# Patient Record
Sex: FEMALE | Race: OTHER | Employment: FULL TIME | URBAN - METROPOLITAN AREA
[De-identification: names, ages, dates, MRNs, and addresses within clinical notes are randomized per-mention and may not be internally consistent; named-entity substitution may affect disease eponyms.]

---

## 2017-06-05 ENCOUNTER — HOSPITAL ENCOUNTER (OUTPATIENT)
Dept: GENERAL RADIOLOGY | Age: 27
Discharge: HOME | End: 2017-06-05
Payer: COMMERCIAL

## 2017-06-05 DIAGNOSIS — A15.0 TB (PULMONARY TUBERCULOSIS): ICD-10-CM

## 2017-06-05 PROCEDURE — 71020 XR CHEST STANDARD TWO VW: CPT

## 2017-07-27 DIAGNOSIS — J45.20 MILD INTERMITTENT ASTHMA WITHOUT COMPLICATION: ICD-10-CM

## 2017-07-27 DIAGNOSIS — M54.2 NECK PAIN: ICD-10-CM

## 2017-07-27 DIAGNOSIS — R20.2 PARESTHESIA OF BOTH HANDS: ICD-10-CM

## 2017-07-27 RX ORDER — GABAPENTIN 300 MG/1
300 CAPSULE ORAL NIGHTLY
Qty: 30 CAPSULE | Refills: 5 | Status: SHIPPED | OUTPATIENT
Start: 2017-07-27 | End: 2017-09-26

## 2017-07-27 RX ORDER — ALBUTEROL SULFATE 90 UG/1
2 AEROSOL, METERED RESPIRATORY (INHALATION) EVERY 6 HOURS PRN
Qty: 1 INHALER | Refills: 5 | Status: SHIPPED | OUTPATIENT
Start: 2017-07-27 | End: 2017-09-26

## 2017-07-27 RX ORDER — MONTELUKAST SODIUM 10 MG/1
10 TABLET ORAL DAILY
Qty: 30 TABLET | Refills: 5 | Status: SHIPPED | OUTPATIENT
Start: 2017-07-27 | End: 2017-09-26

## 2017-09-26 ENCOUNTER — OFFICE VISIT (OUTPATIENT)
Dept: FAMILY MEDICINE CLINIC | Age: 27
End: 2017-09-26

## 2017-09-26 VITALS
BODY MASS INDEX: 31.49 KG/M2 | SYSTOLIC BLOOD PRESSURE: 110 MMHG | OXYGEN SATURATION: 99 % | WEIGHT: 150 LBS | HEART RATE: 82 BPM | DIASTOLIC BLOOD PRESSURE: 70 MMHG | HEIGHT: 58 IN

## 2017-09-26 DIAGNOSIS — L70.0 ACNE VULGARIS: ICD-10-CM

## 2017-09-26 DIAGNOSIS — Z79.899 HIGH RISK MEDICATION USE: ICD-10-CM

## 2017-09-26 DIAGNOSIS — N91.2 AMENORRHEA: ICD-10-CM

## 2017-09-26 DIAGNOSIS — R68.82 LOW LIBIDO: ICD-10-CM

## 2017-09-26 PROCEDURE — 99213 OFFICE O/P EST LOW 20 MIN: CPT | Performed by: FAMILY MEDICINE

## 2017-09-26 RX ORDER — ALBUTEROL SULFATE 90 UG/1
2 AEROSOL, METERED RESPIRATORY (INHALATION) EVERY 6 HOURS PRN
COMMUNITY
End: 2019-09-13 | Stop reason: SDUPTHER

## 2017-09-26 RX ORDER — GABAPENTIN 300 MG/1
300 CAPSULE ORAL NIGHTLY
COMMUNITY
End: 2018-01-18 | Stop reason: ALTCHOICE

## 2017-09-26 RX ORDER — DULOXETIN HYDROCHLORIDE 60 MG/1
60 CAPSULE, DELAYED RELEASE ORAL DAILY
COMMUNITY
End: 2018-08-20

## 2017-09-26 RX ORDER — MONTELUKAST SODIUM 10 MG/1
10 TABLET ORAL NIGHTLY
COMMUNITY
End: 2019-09-13 | Stop reason: SDUPTHER

## 2017-09-26 ASSESSMENT — PATIENT HEALTH QUESTIONNAIRE - PHQ9
2. FEELING DOWN, DEPRESSED OR HOPELESS: 0
SUM OF ALL RESPONSES TO PHQ9 QUESTIONS 1 & 2: 0
1. LITTLE INTEREST OR PLEASURE IN DOING THINGS: 0
SUM OF ALL RESPONSES TO PHQ QUESTIONS 1-9: 0

## 2017-09-26 ASSESSMENT — ENCOUNTER SYMPTOMS
SHORTNESS OF BREATH: 0
ABDOMINAL PAIN: 0

## 2017-09-27 DIAGNOSIS — N91.2 AMENORRHEA: ICD-10-CM

## 2017-09-27 DIAGNOSIS — Z79.899 HIGH RISK MEDICATION USE: ICD-10-CM

## 2017-09-27 LAB
ALT SERPL-CCNC: 14 U/L (ref 0–33)
ANION GAP SERPL CALCULATED.3IONS-SCNC: 15 MEQ/L (ref 7–13)
AST SERPL-CCNC: 21 U/L (ref 0–35)
BASOPHILS ABSOLUTE: 0 K/UL (ref 0–0.2)
BASOPHILS RELATIVE PERCENT: 0.5 %
BUN BLDV-MCNC: 13 MG/DL (ref 6–20)
CALCIUM SERPL-MCNC: 9 MG/DL (ref 8.6–10.2)
CHLORIDE BLD-SCNC: 100 MEQ/L (ref 98–107)
CHOLESTEROL, TOTAL: 187 MG/DL (ref 0–199)
CO2: 24 MEQ/L (ref 22–29)
CREAT SERPL-MCNC: 0.58 MG/DL (ref 0.5–0.9)
EOSINOPHILS ABSOLUTE: 0.1 K/UL (ref 0–0.7)
EOSINOPHILS RELATIVE PERCENT: 1.9 %
FOLLICLE STIMULATING HORMONE: 6 MIU/ML
GFR AFRICAN AMERICAN: >60
GFR NON-AFRICAN AMERICAN: >60
GLUCOSE BLD-MCNC: 83 MG/DL (ref 74–109)
HCG QUALITATIVE: NEGATIVE
HCG(URINE) PREGNANCY TEST: NEGATIVE
HCT VFR BLD CALC: 41.3 % (ref 37–47)
HDLC SERPL-MCNC: 58 MG/DL (ref 40–59)
HEMOGLOBIN: 13 G/DL (ref 12–16)
LDL CHOLESTEROL CALCULATED: 103 MG/DL (ref 0–129)
LUTEINIZING HORMONE: 27.9 MIU/ML
LYMPHOCYTES ABSOLUTE: 1.6 K/UL (ref 1–4.8)
LYMPHOCYTES RELATIVE PERCENT: 31.1 %
MCH RBC QN AUTO: 26 PG (ref 27–31.3)
MCHC RBC AUTO-ENTMCNC: 31.5 % (ref 33–37)
MCV RBC AUTO: 82.7 FL (ref 82–100)
MONOCYTES ABSOLUTE: 0.4 K/UL (ref 0.2–0.8)
MONOCYTES RELATIVE PERCENT: 7.9 %
NEUTROPHILS ABSOLUTE: 3 K/UL (ref 1.4–6.5)
NEUTROPHILS RELATIVE PERCENT: 58.6 %
PDW BLD-RTO: 14.8 % (ref 11.5–14.5)
PLATELET # BLD: 236 K/UL (ref 130–400)
POTASSIUM SERPL-SCNC: 4.6 MEQ/L (ref 3.5–5.1)
RBC # BLD: 4.99 M/UL (ref 4.2–5.4)
SODIUM BLD-SCNC: 139 MEQ/L (ref 132–144)
TRIGL SERPL-MCNC: 129 MG/DL (ref 0–200)
TSH SERPL DL<=0.05 MIU/L-ACNC: 0.9 UIU/ML (ref 0.27–4.2)
WBC # BLD: 5.2 K/UL (ref 4.8–10.8)

## 2017-10-02 DIAGNOSIS — N91.2 AMENORRHEA: Primary | ICD-10-CM

## 2017-10-03 ENCOUNTER — TELEPHONE (OUTPATIENT)
Dept: FAMILY MEDICINE CLINIC | Age: 27
End: 2017-10-03

## 2017-10-03 DIAGNOSIS — N91.2 AMENORRHEA: Primary | ICD-10-CM

## 2017-10-09 ENCOUNTER — TELEPHONE (OUTPATIENT)
Dept: FAMILY MEDICINE CLINIC | Age: 27
End: 2017-10-09

## 2017-10-09 DIAGNOSIS — N64.3 GALACTORRHEA: Primary | ICD-10-CM

## 2017-10-11 DIAGNOSIS — N64.3 GALACTORRHEA: ICD-10-CM

## 2017-10-11 LAB — PROLACTIN: 21.1 NG/ML

## 2017-10-12 ENCOUNTER — HOSPITAL ENCOUNTER (EMERGENCY)
Age: 27
Discharge: HOME | End: 2017-10-13
Payer: COMMERCIAL

## 2017-10-12 ENCOUNTER — APPOINTMENT (OUTPATIENT)
Dept: CT IMAGING | Age: 27
End: 2017-10-12
Payer: COMMERCIAL

## 2017-10-12 VITALS
HEART RATE: 70 BPM | WEIGHT: 150 LBS | TEMPERATURE: 97.9 F | OXYGEN SATURATION: 99 % | RESPIRATION RATE: 18 BRPM | SYSTOLIC BLOOD PRESSURE: 110 MMHG | HEIGHT: 59 IN | BODY MASS INDEX: 30.24 KG/M2 | DIASTOLIC BLOOD PRESSURE: 67 MMHG

## 2017-10-12 DIAGNOSIS — N83.202 CYST OF LEFT OVARY: ICD-10-CM

## 2017-10-12 DIAGNOSIS — R11.0 NAUSEA: ICD-10-CM

## 2017-10-12 DIAGNOSIS — R42 DIZZINESS: ICD-10-CM

## 2017-10-12 DIAGNOSIS — R10.31 RIGHT LOWER QUADRANT ABDOMINAL PAIN: Primary | ICD-10-CM

## 2017-10-12 LAB
ALBUMIN SERPL-MCNC: 4.4 G/DL (ref 3.9–4.9)
ALP BLD-CCNC: 42 U/L (ref 40–130)
ALT SERPL-CCNC: 13 U/L (ref 0–33)
ANION GAP SERPL CALCULATED.3IONS-SCNC: 16 MEQ/L (ref 7–13)
AST SERPL-CCNC: 19 U/L (ref 0–35)
BACTERIA: NORMAL /HPF
BASOPHILS ABSOLUTE: 0 K/UL (ref 0–0.2)
BASOPHILS RELATIVE PERCENT: 0.4 %
BILIRUB SERPL-MCNC: 0.2 MG/DL (ref 0–1.2)
BILIRUBIN URINE: NEGATIVE
BLOOD, URINE: ABNORMAL
BUN BLDV-MCNC: 14 MG/DL (ref 6–20)
CALCIUM SERPL-MCNC: 9.3 MG/DL (ref 8.6–10.2)
CHLORIDE BLD-SCNC: 100 MEQ/L (ref 98–107)
CLARITY: CLEAR
CO2: 23 MEQ/L (ref 22–29)
COLOR: YELLOW
CREAT SERPL-MCNC: 0.66 MG/DL (ref 0.5–0.9)
CRYSTALS, UA: NORMAL
EOSINOPHILS ABSOLUTE: 0.1 K/UL (ref 0–0.7)
EOSINOPHILS RELATIVE PERCENT: 2.1 %
EPITHELIAL CELLS, UA: NORMAL /HPF
GFR AFRICAN AMERICAN: >60
GFR NON-AFRICAN AMERICAN: >60
GLOBULIN: 2.9 G/DL (ref 2.3–3.5)
GLUCOSE BLD-MCNC: 114 MG/DL (ref 74–109)
GLUCOSE URINE: NEGATIVE MG/DL
HCG QUALITATIVE: NEGATIVE
HCT VFR BLD CALC: 40.2 % (ref 37–47)
HEMOGLOBIN: 13.1 G/DL (ref 12–16)
KETONES, URINE: NEGATIVE MG/DL
LEUKOCYTE ESTERASE, URINE: NEGATIVE
LIPASE: 41 U/L (ref 13–60)
LYMPHOCYTES ABSOLUTE: 2.1 K/UL (ref 1–4.8)
LYMPHOCYTES RELATIVE PERCENT: 31.5 %
MCH RBC QN AUTO: 26.4 PG (ref 27–31.3)
MCHC RBC AUTO-ENTMCNC: 32.5 % (ref 33–37)
MCV RBC AUTO: 81.2 FL (ref 82–100)
MONOCYTES ABSOLUTE: 0.7 K/UL (ref 0.2–0.8)
MONOCYTES RELATIVE PERCENT: 10.1 %
MUCUS: PRESENT
NEUTROPHILS ABSOLUTE: 3.7 K/UL (ref 1.4–6.5)
NEUTROPHILS RELATIVE PERCENT: 55.9 %
NITRITE, URINE: NEGATIVE
PDW BLD-RTO: 15 % (ref 11.5–14.5)
PH UA: 5.5 (ref 5–9)
PLATELET # BLD: 223 K/UL (ref 130–400)
POC CREATININE WHOLE BLOOD: 0.8
POTASSIUM SERPL-SCNC: 3.8 MEQ/L (ref 3.5–5.1)
PROLACTIN: 17 NG/ML
PROTEIN UA: NEGATIVE MG/DL
RBC # BLD: 4.96 M/UL (ref 4.2–5.4)
RBC UA: NORMAL /HPF (ref 0–2)
SODIUM BLD-SCNC: 139 MEQ/L (ref 132–144)
SPECIFIC GRAVITY UA: 1.03 (ref 1–1.03)
TOTAL PROTEIN: 7.3 G/DL (ref 6.4–8.1)
URINE REFLEX TO CULTURE: YES
UROBILINOGEN, URINE: 0.2 E.U./DL
WBC # BLD: 6.6 K/UL (ref 4.8–10.8)
WBC UA: NORMAL /HPF (ref 0–5)

## 2017-10-12 PROCEDURE — 36415 COLL VENOUS BLD VENIPUNCTURE: CPT

## 2017-10-12 PROCEDURE — 6360000004 HC RX CONTRAST MEDICATION: Performed by: RADIOLOGY

## 2017-10-12 PROCEDURE — 2580000003 HC RX 258: Performed by: PHYSICIAN ASSISTANT

## 2017-10-12 PROCEDURE — 80053 COMPREHEN METABOLIC PANEL: CPT

## 2017-10-12 PROCEDURE — 87086 URINE CULTURE/COLONY COUNT: CPT

## 2017-10-12 PROCEDURE — 83690 ASSAY OF LIPASE: CPT

## 2017-10-12 PROCEDURE — 84703 CHORIONIC GONADOTROPIN ASSAY: CPT

## 2017-10-12 PROCEDURE — 74177 CT ABD & PELVIS W/CONTRAST: CPT

## 2017-10-12 PROCEDURE — 85025 COMPLETE CBC W/AUTO DIFF WBC: CPT

## 2017-10-12 PROCEDURE — 81001 URINALYSIS AUTO W/SCOPE: CPT

## 2017-10-12 PROCEDURE — 96360 HYDRATION IV INFUSION INIT: CPT

## 2017-10-12 PROCEDURE — 84146 ASSAY OF PROLACTIN: CPT

## 2017-10-12 PROCEDURE — 96361 HYDRATE IV INFUSION ADD-ON: CPT

## 2017-10-12 PROCEDURE — 99284 EMERGENCY DEPT VISIT MOD MDM: CPT

## 2017-10-12 RX ORDER — SODIUM CHLORIDE 0.9 % (FLUSH) 0.9 %
3 SYRINGE (ML) INJECTION EVERY 8 HOURS
Status: DISCONTINUED | OUTPATIENT
Start: 2017-10-12 | End: 2017-10-13 | Stop reason: HOSPADM

## 2017-10-12 RX ORDER — 0.9 % SODIUM CHLORIDE 0.9 %
500 INTRAVENOUS SOLUTION INTRAVENOUS ONCE
Status: COMPLETED | OUTPATIENT
Start: 2017-10-12 | End: 2017-10-13

## 2017-10-12 RX ORDER — MECLIZINE HYDROCHLORIDE 25 MG/1
25 TABLET ORAL 3 TIMES DAILY PRN
Qty: 12 TABLET | Refills: 0 | Status: SHIPPED | OUTPATIENT
Start: 2017-10-12 | End: 2017-10-22

## 2017-10-12 RX ORDER — MECLIZINE HYDROCHLORIDE 25 MG/1
25 TABLET ORAL ONCE
Status: COMPLETED | OUTPATIENT
Start: 2017-10-12 | End: 2017-10-13

## 2017-10-12 RX ORDER — NAPROXEN 375 MG/1
375 TABLET ORAL 2 TIMES DAILY
Qty: 60 TABLET | Refills: 0 | Status: SHIPPED | OUTPATIENT
Start: 2017-10-12 | End: 2018-01-18 | Stop reason: ALTCHOICE

## 2017-10-12 RX ORDER — 0.9 % SODIUM CHLORIDE 0.9 %
500 INTRAVENOUS SOLUTION INTRAVENOUS ONCE
Status: COMPLETED | OUTPATIENT
Start: 2017-10-12 | End: 2017-10-12

## 2017-10-12 RX ORDER — ONDANSETRON 4 MG/1
4 TABLET, FILM COATED ORAL EVERY 8 HOURS PRN
Qty: 10 TABLET | Refills: 0 | Status: SHIPPED | OUTPATIENT
Start: 2017-10-12 | End: 2018-01-18 | Stop reason: ALTCHOICE

## 2017-10-12 RX ORDER — LANOLIN ALCOHOL/MO/W.PET/CERES
3 CREAM (GRAM) TOPICAL NIGHTLY PRN
COMMUNITY
End: 2020-02-06 | Stop reason: CLARIF

## 2017-10-12 RX ADMIN — SODIUM CHLORIDE 500 ML: 9 INJECTION, SOLUTION INTRAVENOUS at 23:44

## 2017-10-12 RX ADMIN — SODIUM CHLORIDE 500 ML: 9 INJECTION, SOLUTION INTRAVENOUS at 21:32

## 2017-10-12 RX ADMIN — IOPAMIDOL 100 ML: 755 INJECTION, SOLUTION INTRAVENOUS at 22:37

## 2017-10-12 ASSESSMENT — PAIN DESCRIPTION - LOCATION
LOCATION: ABDOMEN
LOCATION: ABDOMEN

## 2017-10-12 ASSESSMENT — ENCOUNTER SYMPTOMS
ABDOMINAL PAIN: 1
COUGH: 0
BACK PAIN: 0
EYE PAIN: 0
ANAL BLEEDING: 0
PHOTOPHOBIA: 0
ABDOMINAL DISTENTION: 0
APNEA: 0
NAUSEA: 1
EYE DISCHARGE: 0
VOICE CHANGE: 0

## 2017-10-12 ASSESSMENT — PAIN DESCRIPTION - PAIN TYPE: TYPE: ACUTE PAIN

## 2017-10-12 ASSESSMENT — PAIN DESCRIPTION - DESCRIPTORS: DESCRIPTORS: CRAMPING

## 2017-10-12 ASSESSMENT — PAIN SCALES - GENERAL
PAINLEVEL_OUTOF10: 5
PAINLEVEL_OUTOF10: 5

## 2017-10-12 ASSESSMENT — PAIN DESCRIPTION - ORIENTATION: ORIENTATION: RIGHT

## 2017-10-12 ASSESSMENT — PAIN DESCRIPTION - FREQUENCY: FREQUENCY: CONTINUOUS

## 2017-10-13 LAB
GFR AFRICAN AMERICAN: >60
GFR NON-AFRICAN AMERICAN: >60
PERFORMED ON: NORMAL
POC CREATININE: 0.8 MG/DL (ref 0.6–1.1)
POC SAMPLE TYPE: NORMAL

## 2017-10-13 PROCEDURE — 6370000000 HC RX 637 (ALT 250 FOR IP): Performed by: PHYSICIAN ASSISTANT

## 2017-10-13 PROCEDURE — 96361 HYDRATE IV INFUSION ADD-ON: CPT

## 2017-10-13 RX ADMIN — MECLIZINE HYDROCHLORIDE 25 MG: 25 TABLET ORAL at 00:25

## 2017-10-13 NOTE — ED PROVIDER NOTES
SERUM, QUALITATIVE   PROLACTIN   MICROSCOPIC URINALYSIS       All other labs were within normal range or not returned as of this dictation. EMERGENCY DEPARTMENT COURSE and DIFFERENTIAL DIAGNOSIS/MDM:   Vitals:    Vitals:    10/12/17 2208 10/12/17 2251 10/12/17 2253 10/12/17 2339   BP: (!) 98/57 (!) 86/46 (!) 78/60 110/67   Pulse: 74 76 81 70   Resp: 16 18 18 18   Temp: 99.1 °F (37.3 °C)   97.9 °F (36.6 °C)   TempSrc: Oral   Oral   SpO2: 99%   99%   Weight:       Height:                MDM  Number of Diagnoses or Management Options  Diagnosis management comments: Discussed with patient abdominal pain is okay no longer nauseous now primary complaint is dizziness. He notes this is been a complaint it's coming on in the past.  They've never found a cause for. We discussed follow-up with Dr. Kevin Escoto her ObGyn for the amenorrhea. Amount and/or Complexity of Data Reviewed  Clinical lab tests: reviewed and ordered  Tests in the radiology section of CPT®: ordered and reviewed        CRITICAL CARE TIME       CONSULTS:  None    PROCEDURES:  Unless otherwise noted below, none     Procedures    FINAL IMPRESSION      1. Right lower quadrant abdominal pain    2. Nausea    3. Dizziness    4.  Cyst of left ovary          DISPOSITION/PLAN   DISPOSITION Discharge - Pending Orders Complete    PATIENT REFERRED TO:  Guillermina Vazquez MD  [address] [place] [address], [facility]  [address] [phone]    Schedule an appointment as soon as possible for a visit in 2 days      [facility]) ED  [address] [facility]  [address] [facility] [address] [phone]    If symptoms worsen    Grace Ornelas MD  [address]  [phone]    Schedule an appointment as soon as possible for a visit in 2 days        DISCHARGE MEDICATIONS:  New Prescriptions    MECLIZINE (ANTIVERT) 25 MG TABLET    Take 1 tablet by mouth 3 times daily as needed for Dizziness    NAPROXEN (NAPROSYN) 375 MG TABLET    Take 1 tablet by mouth 2 times daily    ONDANSETRON (ZOFRAN) 4 MG TABLET    Take 1 tablet by mouth every 8 hours as needed for Nausea or Vomiting          (Please note that portions of this note were completed with a voice recognition program.  Efforts were made to edit the dictations but occasionally words are mis-transcribed.)    Sushma Hardin PA-C (electronically signed)  Attending Emergency Physician         Sushma Hardin PA-C  10/13/17 0000

## 2017-10-14 LAB — URINE CULTURE, ROUTINE: NORMAL

## 2017-10-16 ENCOUNTER — OFFICE VISIT (OUTPATIENT)
Dept: OBGYN | Age: 27
End: 2017-10-16

## 2017-10-16 VITALS
BODY MASS INDEX: 30.84 KG/M2 | SYSTOLIC BLOOD PRESSURE: 110 MMHG | HEIGHT: 59 IN | DIASTOLIC BLOOD PRESSURE: 72 MMHG | WEIGHT: 153 LBS

## 2017-10-16 DIAGNOSIS — R10.31 RLQ ABDOMINAL PAIN: ICD-10-CM

## 2017-10-16 DIAGNOSIS — N83.209 CYST OF OVARY, UNSPECIFIED LATERALITY: Primary | ICD-10-CM

## 2017-10-16 DIAGNOSIS — N91.5 OLIGOMENORRHEA, UNSPECIFIED TYPE: ICD-10-CM

## 2017-10-16 PROCEDURE — 1036F TOBACCO NON-USER: CPT | Performed by: OBSTETRICS & GYNECOLOGY

## 2017-10-16 PROCEDURE — 99213 OFFICE O/P EST LOW 20 MIN: CPT | Performed by: OBSTETRICS & GYNECOLOGY

## 2017-10-16 PROCEDURE — G8484 FLU IMMUNIZE NO ADMIN: HCPCS | Performed by: OBSTETRICS & GYNECOLOGY

## 2017-10-16 PROCEDURE — G8427 DOCREV CUR MEDS BY ELIG CLIN: HCPCS | Performed by: OBSTETRICS & GYNECOLOGY

## 2017-10-16 PROCEDURE — G8417 CALC BMI ABV UP PARAM F/U: HCPCS | Performed by: OBSTETRICS & GYNECOLOGY

## 2017-10-16 NOTE — PROGRESS NOTES
Patient here c/o no cycle x 3 months and seen in ER with RLQ pain. CT with 1.3 cm cystic structure on left ( probable ovary only with follicular cyst ). Ordered US. Normal cycles until 3 months ago; no since 2017. Multiple HCG tests and all negative. TFT with PCP negative. No medical changes or surgeries in last 3 months. 20 # weight gain in last 3 months. States eats regularly and exercises . Discussed how weight gain can affect cycles and discussed diet and exercise. F/U results. Pt was seen with total face to face time of 20 minutes with more than 50% of the visit being counseling and education regarding encounter dx of oligomenorrhea. See discussion /counseling details as stated above.           Vitals:  /72   Ht 4' 11\" (1.499 m)   Wt 153 lb (69.4 kg)   LMP 2017   BMI 30.90 kg/m²   Past Medical History:   Diagnosis Date    Abnormal ultrasound of breast 2016    Acne vulgaris 2014    Acute bronchitis     Allergic rhinitis     Amenorrhea     Anemia     Arthralgia     Asthma     Bunionette     CTS (carpal tunnel syndrome) 2014    Fatigue     Fibromyalgia     High risk medication use     [facility]     Dr. Bingham Ear, on plaquenil    Left breast lump     Low libido     Low libido     Low libido     Neck pain     Ovarian cyst 2016    right 3.5 cm    Paresthesia of both hands     Sciatica      Past Surgical History:   Procedure Laterality Date    ABDOMINOPLASTY  2014    BREAST ENHANCEMENT SURGERY  2014     SECTION  2006     SECTION  2004    LIPOSUCTION  2014    TUBAL LIGATION      WRIST SURGERY Right ,     right carpel tunnel release x , Dr. Shanthi Oneill     Allergies:  Pcn [penicillins] and Ultram Veronica Leap hcl]  Current Outpatient Prescriptions   Medication Sig Dispense Refill    melatonin 3 MG TABS tablet Take 3 mg by mouth nightly as needed      meclizine (ANTIVERT) 25 MG tablet Take 1 tablet Constitutional: Negative. Negative for activity change, appetite change, chills, diaphoresis, fatigue, fever and unexpected weight change. HENT: Negative. Eyes: Negative. Respiratory: Negative. Cardiovascular: Negative. Gastrointestinal: Negative for abdominal distention, abdominal pain, anal bleeding, blood in stool, constipation, diarrhea, nausea, rectal pain and vomiting. Endocrine: Negative. Genitourinary: Positive for menstrual problem (Oligomenorrhea). Negative for decreased urine volume, difficulty urinating, dyspareunia, dysuria, enuresis, flank pain, frequency, genital sores, hematuria, pelvic pain, urgency, vaginal bleeding, vaginal discharge and vaginal pain. Musculoskeletal: Negative. Skin: Negative. Allergic/Immunologic: Negative. Neurological: Negative. Hematological: Negative. Psychiatric/Behavioral: Negative. Objective:     Physical Exam   Constitutional: She is oriented to person, place, and time. She appears well-developed and well-nourished. No distress. HENT:   Head: Normocephalic and atraumatic. Eyes: Conjunctivae are normal.   Neck: Normal range of motion. Neck supple. Cardiovascular: Normal rate and regular rhythm. Pulmonary/Chest: Effort normal. No respiratory distress. Musculoskeletal: Normal range of motion. She exhibits no edema, tenderness or deformity. Neurological: She is alert and oriented to person, place, and time. She exhibits normal muscle tone. Coordination normal.   Skin: Skin is warm and dry. She is not diaphoretic. No pallor. Psychiatric: She has a normal mood and affect. Her behavior is normal. Judgment and thought content normal.       Assessment:      1. Cyst of ovary, unspecified laterality  US Pelvis Complete    US Non OB Transvaginal   2.  RLQ abdominal pain  US Pelvis Complete    US Non OB Transvaginal         Plan:      Medications placed this encounter:  No orders of the defined types were placed in this

## 2017-10-19 ASSESSMENT — ENCOUNTER SYMPTOMS
RECTAL PAIN: 0
ANAL BLEEDING: 0
NAUSEA: 0
ABDOMINAL PAIN: 0
ALLERGIC/IMMUNOLOGIC NEGATIVE: 1
VOMITING: 0
CONSTIPATION: 0
DIARRHEA: 0
EYES NEGATIVE: 1
RESPIRATORY NEGATIVE: 1
ABDOMINAL DISTENTION: 0
BLOOD IN STOOL: 0

## 2017-11-16 ENCOUNTER — OFFICE VISIT (OUTPATIENT)
Dept: OBGYN | Age: 27
End: 2017-11-16

## 2017-11-16 VITALS
WEIGHT: 156 LBS | SYSTOLIC BLOOD PRESSURE: 102 MMHG | BODY MASS INDEX: 32.75 KG/M2 | DIASTOLIC BLOOD PRESSURE: 66 MMHG | HEIGHT: 58 IN

## 2017-11-16 DIAGNOSIS — Z11.3 ROUTINE SCREENING FOR STI (SEXUALLY TRANSMITTED INFECTION): ICD-10-CM

## 2017-11-16 DIAGNOSIS — Z01.419 WOMEN'S ANNUAL ROUTINE GYNECOLOGICAL EXAMINATION: Primary | ICD-10-CM

## 2017-11-16 DIAGNOSIS — Z11.51 SPECIAL SCREENING EXAMINATION FOR HUMAN PAPILLOMAVIRUS (HPV): ICD-10-CM

## 2017-11-16 PROCEDURE — 99395 PREV VISIT EST AGE 18-39: CPT | Performed by: OBSTETRICS & GYNECOLOGY

## 2017-11-16 ASSESSMENT — ENCOUNTER SYMPTOMS
CONSTIPATION: 0
DIARRHEA: 0
RESPIRATORY NEGATIVE: 1
ABDOMINAL DISTENTION: 0
EYES NEGATIVE: 1
NAUSEA: 0
ALLERGIC/IMMUNOLOGIC NEGATIVE: 1
RECTAL PAIN: 0
ABDOMINAL PAIN: 0
VOMITING: 0
BLOOD IN STOOL: 0
ANAL BLEEDING: 0

## 2017-11-16 NOTE — PROGRESS NOTES
Subjective:      Patient ID: Harvinder Johnson is a 28 y.o. female    Annual exam.  No GYN complaints. Normal cycles. Pap performed. STD screening offered. Monthly SBE encouraged. F/U annual or prn.       Vitals:  /66   Ht 4' 10\" (1.473 m)   Wt 156 lb (70.8 kg)   LMP 10/26/2017   BMI 32.60 kg/m²   Past Medical History:   Diagnosis Date    Abnormal ultrasound of breast 2016    Acne vulgaris 2014    Acute bronchitis     Allergic rhinitis     Amenorrhea     Anemia     Arthralgia     Asthma     Bunionette     CTS (carpal tunnel syndrome) 2014    Fatigue     Fibromyalgia     High risk medication use     Inflammatory [facility] ([facility]     Dr. Claudette Myers, on plaquenil    Left breast lump     Low libido     Low libido     Low libido     Neck pain     Ovarian cyst 2016    right 3.5 cm    Paresthesia of both hands     Sciatica      Past Surgical History:   Procedure Laterality Date    ABDOMINOPLASTY  2014    BREAST ENHANCEMENT SURGERY  2014     SECTION  2006     SECTION  2004    LIPOSUCTION  2014    TUBAL LIGATION      WRIST SURGERY Right ,     right carpel tunnel release x , Dr. Crenshaw Likes     Allergies:  Pcn [penicillins] and Ultram [place] hcl]  Current Outpatient Prescriptions   Medication Sig Dispense Refill    melatonin 3 MG TABS tablet Take 3 mg by mouth nightly as needed      albuterol sulfate  (90 Base) MCG/ACT inhaler Inhale 2 puffs into the lungs every 6 hours as needed for Wheezing      gabapentin (NEURONTIN) 300 MG capsule Take 300 mg by mouth nightly      montelukast (SINGULAIR) 10 MG tablet Take 10 mg by mouth nightly      ondansetron (ZOFRAN) 4 MG tablet Take 1 tablet by mouth every 8 hours as needed for Nausea or Vomiting 10 tablet 0    naproxen (NAPROSYN) 375 MG tablet Take 1 tablet by mouth 2 times daily 60 tablet 0    DULoxetine (CYMBALTA) 60 MG extended release capsule Take 60 mg by mouth daily No current facility-administered medications for this visit. 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      Social History Main Topics    Smoking status: Never Smoker    Smokeless tobacco: Never Used    Alcohol use 2.5 oz/week     5 Standard drinks or equivalent per week      Comment: social    Drug use: No    Sexual activity: Yes     Partners: Male     Other Topics Concern    Not on file     Social History Narrative    No narrative on file     Family History   Problem Relation Age of Onset    Arthritis Mother      Rheumatoid Arthritis and fibromyalgia    Arthritis Father     High Blood Pressure Maternal Grandfather     Diabetes Maternal Grandfather     Heart Disease Maternal Grandfather     No Known Problems Paternal Grandfather     No Known Problems Paternal Grandmother     No Known Problems Maternal Grandmother     No Known Problems Brother     No Known Problems Sister     No Known Problems Other     Breast Cancer Neg Hx     Cancer Neg Hx     Colon Cancer Neg Hx     Eclampsia Neg Hx     Hypertension Neg Hx     Ovarian Cancer Neg Hx      Labor Neg Hx     Spont Abortions Neg Hx     Stroke Neg Hx        Review of Systems   Constitutional: Negative. Negative for activity change, appetite change, chills, diaphoresis, fatigue, fever and unexpected weight change. HENT: Negative. Eyes: Negative. Respiratory: Negative. Cardiovascular: Negative. Gastrointestinal: Negative for abdominal distention, abdominal pain, anal bleeding, blood in stool, constipation, diarrhea, nausea, rectal pain and vomiting. Endocrine: Negative.     Genitourinary: Negative for decreased urine volume, difficulty urinating, dyspareunia, dysuria, enuresis, flank pain, frequency, genital sores, hematuria, menstrual problem, pelvic pain, urgency, vaginal noted. No erythema. No pallor. Psychiatric: She has a normal mood and affect. Her behavior is normal. Judgment and thought content normal.       Assessment:      1. Women's annual routine gynecological examination  PAP SMEAR   2. Special screening examination for human papillomavirus (HPV)  PAP SMEAR   3. Routine screening for STI (sexually transmitted infection)  PAP SMEAR         Plan:      Medications placed this encounter:  No orders of the defined types were placed in this encounter. Orders placed this encounter:  Orders Placed This Encounter   Procedures    PAP SMEAR     Standing Status:   Future     Standing Expiration Date:   11/16/2018     Order Specific Question:   Collection Type     Answer:   SurePath     Order Specific Question:   Prior Abnormal Pap Test     Answer:   No     Order Specific Question:   Screening or Diagnostic     Answer:   Screening     Order Specific Question:   HPV Requested?      Answer:   HPV 16/18     Order Specific Question:   High Risk Patient     Answer:   N/A         Follow up:  Return in about 1 year (around 11/16/2018) for Annual.

## 2017-11-29 DIAGNOSIS — Z11.51 SPECIAL SCREENING EXAMINATION FOR HUMAN PAPILLOMAVIRUS (HPV): ICD-10-CM

## 2017-11-29 DIAGNOSIS — Z01.419 WOMEN'S ANNUAL ROUTINE GYNECOLOGICAL EXAMINATION: ICD-10-CM

## 2017-11-29 DIAGNOSIS — Z11.3 ROUTINE SCREENING FOR STI (SEXUALLY TRANSMITTED INFECTION): ICD-10-CM

## 2018-01-18 ENCOUNTER — OFFICE VISIT (OUTPATIENT)
Dept: FAMILY MEDICINE CLINIC | Age: 28
End: 2018-01-18

## 2018-01-18 VITALS
BODY MASS INDEX: 33.5 KG/M2 | WEIGHT: 159.6 LBS | HEIGHT: 58 IN | OXYGEN SATURATION: 98 % | HEART RATE: 83 BPM | DIASTOLIC BLOOD PRESSURE: 64 MMHG | SYSTOLIC BLOOD PRESSURE: 98 MMHG

## 2018-01-18 DIAGNOSIS — L70.0 CYSTIC ACNE: ICD-10-CM

## 2018-01-18 DIAGNOSIS — D72.819 LEUKOPENIA, UNSPECIFIED TYPE: Primary | ICD-10-CM

## 2018-01-18 DIAGNOSIS — Z79.899 HIGH RISK MEDICATION USE: ICD-10-CM

## 2018-01-18 DIAGNOSIS — Z79.899 HIGH RISK MEDICATION USE: Primary | ICD-10-CM

## 2018-01-18 LAB
ALT SERPL-CCNC: 15 U/L (ref 0–33)
ANION GAP SERPL CALCULATED.3IONS-SCNC: 16 MEQ/L (ref 7–13)
AST SERPL-CCNC: 21 U/L (ref 0–35)
BASOPHILS ABSOLUTE: 0 K/UL (ref 0–0.2)
BASOPHILS RELATIVE PERCENT: 0.4 %
BUN BLDV-MCNC: 11 MG/DL (ref 6–20)
CALCIUM SERPL-MCNC: 9.5 MG/DL (ref 8.6–10.2)
CHLORIDE BLD-SCNC: 101 MEQ/L (ref 98–107)
CHOLESTEROL, TOTAL: 156 MG/DL (ref 0–199)
CO2: 24 MEQ/L (ref 22–29)
CONTROL: NORMAL
CONTROL: NORMAL
CREAT SERPL-MCNC: 0.58 MG/DL (ref 0.5–0.9)
EOSINOPHILS ABSOLUTE: 0 K/UL (ref 0–0.7)
EOSINOPHILS RELATIVE PERCENT: 1.2 %
GFR AFRICAN AMERICAN: >60
GFR NON-AFRICAN AMERICAN: >60
GLUCOSE BLD-MCNC: 86 MG/DL (ref 74–109)
HCT VFR BLD CALC: 37.4 % (ref 37–47)
HDLC SERPL-MCNC: 46 MG/DL (ref 40–59)
HEMOGLOBIN: 12.3 G/DL (ref 12–16)
LDL CHOLESTEROL CALCULATED: 71 MG/DL (ref 0–129)
LYMPHOCYTES ABSOLUTE: 1.2 K/UL (ref 1–4.8)
LYMPHOCYTES RELATIVE PERCENT: 30 %
MCH RBC QN AUTO: 27.5 PG (ref 27–31.3)
MCHC RBC AUTO-ENTMCNC: 32.8 % (ref 33–37)
MCV RBC AUTO: 83.9 FL (ref 82–100)
MONOCYTES ABSOLUTE: 0.4 K/UL (ref 0.2–0.8)
MONOCYTES RELATIVE PERCENT: 10.3 %
NEUTROPHILS ABSOLUTE: 2.2 K/UL (ref 1.4–6.5)
NEUTROPHILS RELATIVE PERCENT: 58.1 %
PDW BLD-RTO: 14.8 % (ref 11.5–14.5)
PLATELET # BLD: 212 K/UL (ref 130–400)
POTASSIUM SERPL-SCNC: 4.2 MEQ/L (ref 3.5–5.1)
PREGNANCY TEST URINE, POC: NORMAL
PREGNANCY TEST URINE, POC: NORMAL
RBC # BLD: 4.46 M/UL (ref 4.2–5.4)
SLIDE REVIEW: ABNORMAL
SODIUM BLD-SCNC: 141 MEQ/L (ref 132–144)
TRIGL SERPL-MCNC: 197 MG/DL (ref 0–200)
WBC # BLD: 3.8 K/UL (ref 4.8–10.8)

## 2018-01-18 PROCEDURE — 99213 OFFICE O/P EST LOW 20 MIN: CPT | Performed by: FAMILY MEDICINE

## 2018-01-18 PROCEDURE — 1036F TOBACCO NON-USER: CPT | Performed by: FAMILY MEDICINE

## 2018-01-18 PROCEDURE — G8427 DOCREV CUR MEDS BY ELIG CLIN: HCPCS | Performed by: FAMILY MEDICINE

## 2018-01-18 PROCEDURE — G8417 CALC BMI ABV UP PARAM F/U: HCPCS | Performed by: FAMILY MEDICINE

## 2018-01-18 PROCEDURE — G8484 FLU IMMUNIZE NO ADMIN: HCPCS | Performed by: FAMILY MEDICINE

## 2018-01-18 RX ORDER — PREGABALIN 50 MG/1
50 CAPSULE ORAL DAILY
COMMUNITY
End: 2018-05-10

## 2018-01-18 ASSESSMENT — ENCOUNTER SYMPTOMS
SHORTNESS OF BREATH: 0
ABDOMINAL PAIN: 0

## 2018-01-18 NOTE — PROGRESS NOTES
Mar Kwon, 28 y.o. female presents today with:  Chief Complaint   Patient presents with    Acne       HPI    Acne  Patient presents for evaluation of acne. Onset was several months ago. Symptoms have waxed and waned but are worse overall. Lesions are described as closed comedones, cysts, nodules, open comedones, scars and superficial pustules. Acne is primarily located on the face. The patient also reports no change from last visit. Treatment to date has included OTC. She has also been on retin A,  Was on accutane about 2 years ago and it worked well, wants to resume. Review of Systems   Constitutional: Negative for fever. Respiratory: Negative for shortness of breath. Cardiovascular: Negative for chest pain. Gastrointestinal: Negative for abdominal pain. Skin: Negative for rash.        Past Medical History:   Diagnosis Date    Abnormal ultrasound of breast 2016    Acute bronchitis     Allergic rhinitis     Amenorrhea     Anemia     Arthralgia     Asthma     Bunionette     CTS (carpal tunnel syndrome) 2014    Cystic acne 2014    Fatigue     Fibromyalgia     High risk medication use     Inflammatory polyarthropathy ([address]     Dr. Miki Schmidt, on plaquenil    Left breast lump     Low libido     Low libido     Low libido     Neck pain     Ovarian cyst 2016    right 3.5 cm    Paresthesia of both hands     Sciatica      Past Surgical History:   Procedure Laterality Date    ABDOMINOPLASTY  2014    BREAST ENHANCEMENT SURGERY  2014     SECTION  2006     SECTION  2004    LIPOSUCTION  2014    TUBAL LIGATION      WRIST SURGERY Right ,     right carpel tunnel release x , Dr. 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      Social History Main Topics    Smoking status: Never Smoker    Smokeless tobacco: Never Used    Alcohol use 2.5 oz/week     5 Standard drinks or equivalent per week      Comment: social    Drug use: No    Sexual activity: Yes     Partners: Male     Other Topics Concern    Not on file     Social History Narrative    No narrative on file     Family History   Problem Relation Age of Onset    Arthritis Mother      Rheumatoid Arthritis and fibromyalgia    Arthritis Father     High Blood Pressure Maternal Grandfather     Diabetes Maternal Grandfather     Heart Disease Maternal Grandfather     No Known Problems Paternal Grandfather     No Known Problems Paternal Grandmother     No Known Problems Maternal Grandmother     No Known Problems Brother     No Known Problems Sister     No Known Problems Other     Breast Cancer Neg Hx     Cancer Neg Hx     Colon Cancer Neg Hx     Eclampsia Neg Hx     Hypertension Neg Hx     Ovarian Cancer Neg Hx      Labor Neg Hx     Spont Abortions Neg Hx     Stroke Neg Hx      Allergies   Allergen Reactions    Pcn [Penicillins] Hives, Shortness Of Breath, Anaphylaxis, Itching and Rash    Ultram [Tramadol Hcl] Other (See Comments)     Made her feel funny in her hand     Current Outpatient Prescriptions   Medication Sig Dispense Refill    pregabalin (LYRICA) 50 MG capsule Take 50 mg by mouth daily.  melatonin 3 MG TABS tablet Take 3 mg by mouth nightly as needed      DULoxetine (CYMBALTA) 60 MG extended release capsule Take 60 mg by mouth daily      albuterol sulfate  (90 Base) MCG/ACT inhaler Inhale 2 puffs into the lungs every 6 hours as needed for Wheezing      montelukast (SINGULAIR) 10 MG tablet Take 10 mg by mouth nightly       No current facility-administered medications for this visit.         Objective    Vitals:    18 0807   BP: 98/64   Pulse: 83   SpO2: 98%   Weight: 159 lb 9.6 oz (72.4 kg)   Height: 4' 10\" (1.473 m)       Physical Exam   Constitutional: She appears pregnancy tests before starting the medication. One at the first visit and the second at least 19 days later preferably during the first 5 days of menstrual cycle. Females were advised that there is a mandatory waiting period of 30 days before starting the medication after negative pregnancy tests, and a monthly pregnancy test while on the drug which continues until 1 month after the last dose. Females were told that they must access the iPledge Program each month and answer the questions and confirm the 2 forms of birth control that they are using. Consent must be signed. You must answer the questions in iPledge after our office has entered the negative pregnancy results into the computer. You must obtain your prescription within 7 days counting the day of the test as Day 1. Orders Placed This Encounter   Procedures    ALT     Standing Status:   Standing     Number of Occurrences:   6     Standing Expiration Date:   8/18/2018    AST     Standing Status:   Standing     Number of Occurrences:   6     Standing Expiration Date:   8/18/2018    Basic Metabolic Panel     Standing Status:   Standing     Number of Occurrences:   6     Standing Expiration Date:   8/18/2018    CBC Auto Differential     Standing Status:   Standing     Number of Occurrences:   6     Standing Expiration Date:   8/18/2018    Lipid Panel     Standing Status:   Standing     Number of Occurrences:   6     Standing Expiration Date:   8/18/2018     Order Specific Question:   Is Patient Fasting?/# of Hours     Answer:   yes, 12 hours    POCT urine pregnancy     Standing Status:   Standing     Number of Occurrences:   6     Standing Expiration Date:   8/18/2018     No orders of the defined types were placed in this encounter.     Medications Discontinued During This Encounter   Medication Reason    naproxen (NAPROSYN) 375 MG tablet Therapy completed    ondansetron (ZOFRAN) 4 MG tablet Therapy completed    gabapentin (NEURONTIN) 300 MG capsule Therapy completed     Return in about 1 month (around 2/18/2018).     Geno Hopkins MD

## 2018-02-16 DIAGNOSIS — D72.819 LEUKOPENIA, UNSPECIFIED TYPE: ICD-10-CM

## 2018-02-16 LAB — WBC # BLD: 4.8 K/UL (ref 4.8–10.8)

## 2018-02-19 ENCOUNTER — OFFICE VISIT (OUTPATIENT)
Dept: FAMILY MEDICINE CLINIC | Age: 28
End: 2018-02-19
Payer: COMMERCIAL

## 2018-02-19 VITALS
DIASTOLIC BLOOD PRESSURE: 80 MMHG | HEART RATE: 66 BPM | BODY MASS INDEX: 33.37 KG/M2 | OXYGEN SATURATION: 97 % | WEIGHT: 159 LBS | HEIGHT: 58 IN | SYSTOLIC BLOOD PRESSURE: 116 MMHG

## 2018-02-19 DIAGNOSIS — L70.0 CYSTIC ACNE: Primary | ICD-10-CM

## 2018-02-19 DIAGNOSIS — Z79.899 HIGH RISK MEDICATION USE: ICD-10-CM

## 2018-02-19 LAB
ALT SERPL-CCNC: 12 U/L (ref 0–33)
ANION GAP SERPL CALCULATED.3IONS-SCNC: 14 MEQ/L (ref 7–13)
AST SERPL-CCNC: 19 U/L (ref 0–35)
BASOPHILS ABSOLUTE: 0 K/UL (ref 0–0.2)
BASOPHILS RELATIVE PERCENT: 0.4 %
BUN BLDV-MCNC: 12 MG/DL (ref 6–20)
CALCIUM SERPL-MCNC: 9.2 MG/DL (ref 8.6–10.2)
CHLORIDE BLD-SCNC: 100 MEQ/L (ref 98–107)
CHOLESTEROL, TOTAL: 160 MG/DL (ref 0–199)
CO2: 26 MEQ/L (ref 22–29)
CONTROL: NORMAL
CREAT SERPL-MCNC: 0.56 MG/DL (ref 0.5–0.9)
EOSINOPHILS ABSOLUTE: 0.1 K/UL (ref 0–0.7)
EOSINOPHILS RELATIVE PERCENT: 2.7 %
GFR AFRICAN AMERICAN: >60
GFR NON-AFRICAN AMERICAN: >60
GLUCOSE BLD-MCNC: 116 MG/DL (ref 74–109)
HCT VFR BLD CALC: 37.8 % (ref 37–47)
HDLC SERPL-MCNC: 55 MG/DL (ref 40–59)
HEMOGLOBIN: 12.1 G/DL (ref 12–16)
LDL CHOLESTEROL CALCULATED: 88 MG/DL (ref 0–129)
LYMPHOCYTES ABSOLUTE: 1.9 K/UL (ref 1–4.8)
LYMPHOCYTES RELATIVE PERCENT: 37.1 %
MCH RBC QN AUTO: 26.9 PG (ref 27–31.3)
MCHC RBC AUTO-ENTMCNC: 32.1 % (ref 33–37)
MCV RBC AUTO: 83.8 FL (ref 82–100)
MONOCYTES ABSOLUTE: 0.4 K/UL (ref 0.2–0.8)
MONOCYTES RELATIVE PERCENT: 7.4 %
NEUTROPHILS ABSOLUTE: 2.7 K/UL (ref 1.4–6.5)
NEUTROPHILS RELATIVE PERCENT: 52.4 %
PDW BLD-RTO: 14.6 % (ref 11.5–14.5)
PLATELET # BLD: 244 K/UL (ref 130–400)
POTASSIUM SERPL-SCNC: 4.5 MEQ/L (ref 3.5–5.1)
PREGNANCY TEST URINE, POC: NORMAL
RBC # BLD: 4.5 M/UL (ref 4.2–5.4)
SODIUM BLD-SCNC: 140 MEQ/L (ref 132–144)
TRIGL SERPL-MCNC: 84 MG/DL (ref 0–200)
WBC # BLD: 5.1 K/UL (ref 4.8–10.8)

## 2018-02-19 PROCEDURE — 81025 URINE PREGNANCY TEST: CPT | Performed by: FAMILY MEDICINE

## 2018-02-19 PROCEDURE — 1036F TOBACCO NON-USER: CPT | Performed by: FAMILY MEDICINE

## 2018-02-19 PROCEDURE — G8427 DOCREV CUR MEDS BY ELIG CLIN: HCPCS | Performed by: FAMILY MEDICINE

## 2018-02-19 PROCEDURE — G8417 CALC BMI ABV UP PARAM F/U: HCPCS | Performed by: FAMILY MEDICINE

## 2018-02-19 PROCEDURE — G8484 FLU IMMUNIZE NO ADMIN: HCPCS | Performed by: FAMILY MEDICINE

## 2018-02-19 PROCEDURE — 99213 OFFICE O/P EST LOW 20 MIN: CPT | Performed by: FAMILY MEDICINE

## 2018-02-19 RX ORDER — ISOTRETINOIN 20 MG/1
20 CAPSULE ORAL 2 TIMES DAILY
Qty: 60 CAPSULE | Refills: 0 | Status: SHIPPED | OUTPATIENT
Start: 2018-02-19 | End: 2018-03-08 | Stop reason: DRUGHIGH

## 2018-02-19 ASSESSMENT — ENCOUNTER SYMPTOMS
SHORTNESS OF BREATH: 0
ABDOMINAL PAIN: 0

## 2018-02-19 NOTE — PROGRESS NOTES
Subjective  Pantera Dillard, 28 y.o. female presents today with:  Chief Complaint   Patient presents with    Acne       HPI    Acne  Patient presents for evaluation of acne. Onset was several months ago. Symptoms have waxed and waned but are worse overall. Lesions are described as closed comedones, cysts, nodules, open comedones, scars and superficial pustules. Acne is primarily located on the face. The patient also reports no change from last visit. Treatment to date has included OTC. She has also been on retin A,  Was on accutane about 2 years ago and it worked well, wants to resume. Had labs in  and WBC high but retest done last week and now normal.     Review of Systems   Constitutional: Negative for fever. Respiratory: Negative for shortness of breath. Cardiovascular: Negative for chest pain. Gastrointestinal: Negative for abdominal pain. Skin: Negative for rash.        Past Medical History:   Diagnosis Date    Abnormal ultrasound of breast 2016    Acute bronchitis     Allergic rhinitis     Amenorrhea     Anemia     Arthralgia     Asthma     Bunionette     CTS (carpal tunnel syndrome) 2014    Cystic acne 2014    Fatigue     Fibromyalgia     High risk medication use     Inflammatory polyarthropathy ([facility]     Dr. eSbastian Alexandra, on plaquenil    Left breast lump     Low libido     Low libido     Low libido     Neck pain     Ovarian cyst 2016    right 3.5 cm    Paresthesia of both hands     Sciatica      Past Surgical History:   Procedure Laterality Date    ABDOMINOPLASTY  2014    BREAST ENHANCEMENT SURGERY  2014     SECTION  2006     SECTION  2004    LIPOSUCTION  2014    TUBAL LIGATION      WRIST SURGERY Right ,     right carpel tunnel release x , Dr. Nelson Teresa History     Social History    Marital status:      Spouse name: N/A    Number of children: 2    Years of education: N/A     Occupational History and the iPledge Patient Introductory Brochure was given to the patient today. Consent signed. Females Only:  Females were told that they would also need to do a monthly pregnancy test. Females were told that using 2 forms of birth control at all times are needed in order to take this medication due to the risk of birth defects for a woman who would become pregnant while taking this drug. Females need 2 negative pregnancy tests before starting the medication. One at the first visit and the second at least 19 days later preferably during the first 5 days of menstrual cycle. Females were advised that there is a mandatory waiting period of 30 days before starting the medication after negative pregnancy tests, and a monthly pregnancy test while on the drug which continues until 1 month after the last dose. Females were told that they must access the iPledge Program each month and answer the questions and confirm the 2 forms of birth control that they are using. Consent must be signed. You must answer the questions in iPledge after our office has entered the negative pregnancy results into the computer. You must obtain your prescription within 7 days counting the day of the test as Day 1.       Orders Placed This Encounter   Procedures    ALT     Standing Status:   Standing     Number of Occurrences:   6     Standing Expiration Date:   8/19/2018    AST     Standing Status:   Standing     Number of Occurrences:   6     Standing Expiration Date:   8/19/2018    Basic Metabolic Panel     Standing Status:   Standing     Number of Occurrences:   6     Standing Expiration Date:   8/19/2018    CBC Auto Differential     Standing Status:   Standing     Number of Occurrences:   6     Standing Expiration Date:   8/19/2018    Lipid Panel     Standing Status:   Standing     Number of Occurrences:   6     Standing Expiration Date:   8/19/2018     Order Specific Question:   Is Patient Fasting?/# of Hours     Answer:   yes, 12

## 2018-02-20 DIAGNOSIS — R73.9 HYPERGLYCEMIA: Primary | ICD-10-CM

## 2018-02-21 LAB — HBA1C MFR BLD: 5.9 % (ref 4.8–5.9)

## 2018-02-22 DIAGNOSIS — R73.9 HYPERGLYCEMIA: Primary | ICD-10-CM

## 2018-02-22 PROBLEM — R73.03 PREDIABETES: Status: ACTIVE | Noted: 2018-02-01

## 2018-03-08 ENCOUNTER — OFFICE VISIT (OUTPATIENT)
Dept: FAMILY MEDICINE CLINIC | Age: 28
End: 2018-03-08
Payer: COMMERCIAL

## 2018-03-08 VITALS
BODY MASS INDEX: 33.58 KG/M2 | HEART RATE: 65 BPM | SYSTOLIC BLOOD PRESSURE: 102 MMHG | WEIGHT: 160 LBS | HEIGHT: 58 IN | OXYGEN SATURATION: 96 % | DIASTOLIC BLOOD PRESSURE: 68 MMHG

## 2018-03-08 DIAGNOSIS — L70.0 CYSTIC ACNE: ICD-10-CM

## 2018-03-08 DIAGNOSIS — R73.03 PREDIABETES: Primary | ICD-10-CM

## 2018-03-08 DIAGNOSIS — Z79.899 HIGH RISK MEDICATION USE: ICD-10-CM

## 2018-03-08 LAB
CONTROL: NORMAL
PREGNANCY TEST URINE, POC: NEGATIVE

## 2018-03-08 PROCEDURE — G8484 FLU IMMUNIZE NO ADMIN: HCPCS | Performed by: FAMILY MEDICINE

## 2018-03-08 PROCEDURE — G8427 DOCREV CUR MEDS BY ELIG CLIN: HCPCS | Performed by: FAMILY MEDICINE

## 2018-03-08 PROCEDURE — 1036F TOBACCO NON-USER: CPT | Performed by: FAMILY MEDICINE

## 2018-03-08 PROCEDURE — G8417 CALC BMI ABV UP PARAM F/U: HCPCS | Performed by: FAMILY MEDICINE

## 2018-03-08 PROCEDURE — 81025 URINE PREGNANCY TEST: CPT | Performed by: FAMILY MEDICINE

## 2018-03-08 PROCEDURE — 99213 OFFICE O/P EST LOW 20 MIN: CPT | Performed by: FAMILY MEDICINE

## 2018-03-08 RX ORDER — ISOTRETINOIN 30 MG/1
30 CAPSULE ORAL 2 TIMES DAILY
Qty: 60 CAPSULE | Refills: 0 | Status: SHIPPED | OUTPATIENT
Start: 2018-03-08 | End: 2018-03-12 | Stop reason: SDUPTHER

## 2018-03-08 ASSESSMENT — ENCOUNTER SYMPTOMS
SHORTNESS OF BREATH: 0
ABDOMINAL PAIN: 0

## 2018-03-08 NOTE — PROGRESS NOTES
Subjective  [place], 28 y.o. female presents today with:  Chief Complaint   Patient presents with    1 Month Follow-Up       HPI    Acne    Here today for f/u on cystic acne and to review labs. She just started accutane 1 month ago. Doing well and denies side effects except dry nose and lips. Review of Systems   Constitutional: Negative for fever. Respiratory: Negative for shortness of breath. Cardiovascular: Negative for chest pain. Gastrointestinal: Negative for abdominal pain. Skin: Negative for rash.        Past Medical History:   Diagnosis Date    Abnormal ultrasound of breast 2016    Acute bronchitis     Allergic rhinitis     Amenorrhea     Anemia     Arthralgia     Asthma     Bunionette     CTS (carpal tunnel syndrome) 2014    Cystic acne 2014    Fatigue     Fibromyalgia     High risk medication use     Inflammatory polyarthropathy ([address]     Dr. Deven Lowe, on plaquenil    Left breast lump     Low libido     Low libido     Low libido     Neck pain     Ovarian cyst 2016    right 3.5 cm    Paresthesia of both hands     Prediabetes 2018    Sciatica      Past Surgical History:   Procedure Laterality Date    ABDOMINOPLASTY  2014    BREAST ENHANCEMENT SURGERY  2014     SECTION  2006     SECTION  2004    LIPOSUCTION  2014    TUBAL LIGATION      WRIST SURGERY Right ,     right carpel tunnel release x , Dr. Chaparro Pascual History     Social History    Marital status:      Spouse name: N/A    Number of children: 2    Years of education: N/A     Occupational History    Home [employer]      Social History Main Topics    Smoking status: Never Smoker    Smokeless tobacco: Never Used    Alcohol use 2.5 oz/week     5 Standard drinks or equivalent per week      Comment: social    Drug use: No    Sexual activity: Yes     Partners: Male     Other Topics Concern    Not on file     Social History Narrative Normal rate, regular rhythm, normal heart sounds and intact distal pulses. No murmur heard. Pulmonary/Chest: Effort normal and breath sounds normal.   Abdominal: Soft. Bowel sounds are normal.   Skin: Skin is warm and dry. 1-2+ erythematous papules on face with moderate scarring     Lab Results   Component Value Date    LABA1C 5.9 02/19/2018     Assessment & Plan   1. Prediabetes     2. Cystic acne  ISOtretinoin (ACCUTANE) 30 MG chemo capsule     Reviewed labs showing prediabetes. Plan as noted above. She had a tubal and using condoms for [facility]. Time spent face to face with patient was 15 minutes today with more than 50% of my time spent counseling patient. The video was watched and consent was signed. No orders of the defined types were placed in this encounter. Orders Placed This Encounter   Medications    ISOtretinoin (ACCUTANE) 30 MG chemo capsule     Sig: Take 1 capsule by mouth 2 times daily     Dispense:  60 capsule     Refill:  0     Medications Discontinued During This Encounter   Medication Reason    ISOtretinoin (ACCUTANE) 20 MG chemo capsule Dose adjustment     Return in about 4 weeks (around 4/5/2018).     Oretha Baumgarten, MD

## 2018-03-08 NOTE — PATIENT INSTRUCTIONS
Patient Education        Prediabetes: Care Instructions  Your Care Instructions    Prediabetes is a warning sign that you are at risk for getting type 2 diabetes. It means that your blood sugar is higher than it should be. The food you eat turns into sugar, which your body uses for energy. Normally, an organ called the pancreas makes insulin, which allows the sugar in your blood to get into your body's cells. But when your body can't use insulin the right way, the sugar doesn't move into cells. It stays in your blood instead. This is called insulin resistance. The buildup of sugar in the blood causes prediabetes. The good news is that lifestyle changes may help you get your blood sugar back to normal and help you avoid or delay diabetes. Follow-up care is a key part of your treatment and safety. Be sure to make and go to all appointments, and call your doctor if you are having problems. It's also a good idea to know your test results and keep a list of the medicines you take. How can you care for yourself at home? · Watch your weight. A healthy weight helps your body use insulin properly. · Limit the amount of calories, sweets, and unhealthy fat you eat. Ask your doctor if you should see a dietitian. A registered dietitian can help you create meal plans that fit your lifestyle. · Get at least 30 minutes of exercise on most days of the week. Exercise helps control your blood sugar. It also helps you maintain a healthy weight. Walking is a good choice. You also may want to do other activities, such as running, swimming, cycling, or playing tennis or team sports. · Do not smoke. Smoking can make prediabetes worse. If you need help quitting, talk to your doctor about stop-smoking programs and medicines. These can increase your chances of quitting for good. · If your doctor prescribed medicines, take them exactly as prescribed. Call your doctor if you think you are having a problem with your medicine.  You will get more details on the specific medicines your doctor prescribes. When should you call for help? Watch closely for changes in your health, and be sure to contact your doctor if:  ? · You have any symptoms of diabetes. These may include:  ¨ Being thirsty more often. ¨ Urinating more. ¨ Being hungrier. ¨ Losing weight. ¨ Being very tired. ¨ Having blurry vision. ? · You have a wound that will not heal.   ? · You have an infection that will not go away. ? · You have problems with your blood pressure. ? · You want more information about diabetes and how you can keep from getting it. Where can you learn more? Go to https://[email or web address] org and sign in to your [employer] account. Enter I222 in the [employer] box to learn more about \"Prediabetes: Care Instructions. \"     If you do not have an account, please click on the \"Sign Up Now\" link. Current as of: March 13, 2017  Content Version: 11.5  © 5859-5115 Healthwise, Incorporated. Care instructions adapted under license by [address] If you have questions about a medical condition or this instruction, always ask your healthcare professional. [place] [address] any warranty or liability for your use of this information.

## 2018-03-12 DIAGNOSIS — L70.0 CYSTIC ACNE: ICD-10-CM

## 2018-03-12 RX ORDER — ISOTRETINOIN 30 MG/1
30 CAPSULE ORAL 2 TIMES DAILY
Qty: 60 CAPSULE | Refills: 0 | Status: SHIPPED | OUTPATIENT
Start: 2018-03-12 | End: 2018-04-12 | Stop reason: SDUPTHER

## 2018-03-28 ENCOUNTER — TELEPHONE (OUTPATIENT)
Dept: FAMILY MEDICINE CLINIC | Age: 28
End: 2018-03-28

## 2018-04-04 ENCOUNTER — TELEPHONE (OUTPATIENT)
Dept: FAMILY MEDICINE CLINIC | Age: 28
End: 2018-04-04

## 2018-04-12 ENCOUNTER — OFFICE VISIT (OUTPATIENT)
Dept: FAMILY MEDICINE CLINIC | Age: 28
End: 2018-04-12
Payer: COMMERCIAL

## 2018-04-12 VITALS
SYSTOLIC BLOOD PRESSURE: 98 MMHG | BODY MASS INDEX: 33.75 KG/M2 | HEIGHT: 58 IN | DIASTOLIC BLOOD PRESSURE: 64 MMHG | WEIGHT: 160.8 LBS

## 2018-04-12 DIAGNOSIS — L70.0 CYSTIC ACNE: Primary | ICD-10-CM

## 2018-04-12 DIAGNOSIS — Z79.899 HIGH RISK MEDICATION USE: ICD-10-CM

## 2018-04-12 DIAGNOSIS — H00.013 HORDEOLUM EXTERNUM OF RIGHT EYE, UNSPECIFIED EYELID: ICD-10-CM

## 2018-04-12 LAB
ALT SERPL-CCNC: 16 U/L (ref 0–33)
ANION GAP SERPL CALCULATED.3IONS-SCNC: 12 MEQ/L (ref 7–13)
AST SERPL-CCNC: 19 U/L (ref 0–35)
BASOPHILS ABSOLUTE: 0 K/UL (ref 0–0.2)
BASOPHILS RELATIVE PERCENT: 0.5 %
BUN BLDV-MCNC: 11 MG/DL (ref 6–20)
CALCIUM SERPL-MCNC: 9.4 MG/DL (ref 8.6–10.2)
CHLORIDE BLD-SCNC: 100 MEQ/L (ref 98–107)
CHOLESTEROL, TOTAL: 178 MG/DL (ref 0–199)
CO2: 25 MEQ/L (ref 22–29)
CONTROL: NORMAL
CREAT SERPL-MCNC: 0.49 MG/DL (ref 0.5–0.9)
EOSINOPHILS ABSOLUTE: 0.1 K/UL (ref 0–0.7)
EOSINOPHILS RELATIVE PERCENT: 1.1 %
GFR AFRICAN AMERICAN: >60
GFR NON-AFRICAN AMERICAN: >60
GLUCOSE BLD-MCNC: 86 MG/DL (ref 74–109)
HCT VFR BLD CALC: 39.6 % (ref 37–47)
HDLC SERPL-MCNC: 56 MG/DL (ref 40–59)
HEMOGLOBIN: 13 G/DL (ref 12–16)
LDL CHOLESTEROL CALCULATED: 96 MG/DL (ref 0–129)
LYMPHOCYTES ABSOLUTE: 1.8 K/UL (ref 1–4.8)
LYMPHOCYTES RELATIVE PERCENT: 33.4 %
MCH RBC QN AUTO: 26.9 PG (ref 27–31.3)
MCHC RBC AUTO-ENTMCNC: 32.8 % (ref 33–37)
MCV RBC AUTO: 82.1 FL (ref 82–100)
MONOCYTES ABSOLUTE: 0.3 K/UL (ref 0.2–0.8)
MONOCYTES RELATIVE PERCENT: 6.2 %
NEUTROPHILS ABSOLUTE: 3.2 K/UL (ref 1.4–6.5)
NEUTROPHILS RELATIVE PERCENT: 58.8 %
PDW BLD-RTO: 14.6 % (ref 11.5–14.5)
PLATELET # BLD: 218 K/UL (ref 130–400)
POTASSIUM SERPL-SCNC: 4.3 MEQ/L (ref 3.5–5.1)
PREGNANCY TEST URINE, POC: NEGATIVE
RBC # BLD: 4.83 M/UL (ref 4.2–5.4)
SODIUM BLD-SCNC: 137 MEQ/L (ref 132–144)
TRIGL SERPL-MCNC: 129 MG/DL (ref 0–200)
WBC # BLD: 5.4 K/UL (ref 4.8–10.8)

## 2018-04-12 PROCEDURE — 1036F TOBACCO NON-USER: CPT | Performed by: FAMILY MEDICINE

## 2018-04-12 PROCEDURE — 81025 URINE PREGNANCY TEST: CPT | Performed by: FAMILY MEDICINE

## 2018-04-12 PROCEDURE — G8417 CALC BMI ABV UP PARAM F/U: HCPCS | Performed by: FAMILY MEDICINE

## 2018-04-12 PROCEDURE — G8427 DOCREV CUR MEDS BY ELIG CLIN: HCPCS | Performed by: FAMILY MEDICINE

## 2018-04-12 PROCEDURE — 99213 OFFICE O/P EST LOW 20 MIN: CPT | Performed by: FAMILY MEDICINE

## 2018-04-12 RX ORDER — POLYMYXIN B SULFATE AND TRIMETHOPRIM 1; 10000 MG/ML; [USP'U]/ML
1 SOLUTION OPHTHALMIC EVERY 4 HOURS
Qty: 10 ML | Refills: 0 | Status: SHIPPED | OUTPATIENT
Start: 2018-04-12 | End: 2018-04-22

## 2018-04-12 RX ORDER — ISOTRETINOIN 30 MG/1
30 CAPSULE ORAL 2 TIMES DAILY
Qty: 60 CAPSULE | Refills: 0 | Status: SHIPPED | OUTPATIENT
Start: 2018-04-12 | End: 2018-05-10 | Stop reason: SDUPTHER

## 2018-04-12 ASSESSMENT — ENCOUNTER SYMPTOMS
ABDOMINAL PAIN: 0
SHORTNESS OF BREATH: 0

## 2018-05-10 ENCOUNTER — OFFICE VISIT (OUTPATIENT)
Dept: FAMILY MEDICINE CLINIC | Age: 28
End: 2018-05-10
Payer: COMMERCIAL

## 2018-05-10 VITALS
BODY MASS INDEX: 34.13 KG/M2 | WEIGHT: 162.6 LBS | SYSTOLIC BLOOD PRESSURE: 102 MMHG | OXYGEN SATURATION: 98 % | HEIGHT: 58 IN | HEART RATE: 93 BPM | DIASTOLIC BLOOD PRESSURE: 78 MMHG

## 2018-05-10 DIAGNOSIS — L70.0 CYSTIC ACNE: Primary | ICD-10-CM

## 2018-05-10 DIAGNOSIS — R73.03 PREDIABETES: ICD-10-CM

## 2018-05-10 DIAGNOSIS — Z79.899 HIGH RISK MEDICATION USE: ICD-10-CM

## 2018-05-10 DIAGNOSIS — R73.9 HYPERGLYCEMIA: ICD-10-CM

## 2018-05-10 DIAGNOSIS — R63.5 WEIGHT GAIN: ICD-10-CM

## 2018-05-10 PROCEDURE — 99213 OFFICE O/P EST LOW 20 MIN: CPT | Performed by: FAMILY MEDICINE

## 2018-05-10 PROCEDURE — 81025 URINE PREGNANCY TEST: CPT | Performed by: FAMILY MEDICINE

## 2018-05-10 PROCEDURE — G8427 DOCREV CUR MEDS BY ELIG CLIN: HCPCS | Performed by: FAMILY MEDICINE

## 2018-05-10 PROCEDURE — 1036F TOBACCO NON-USER: CPT | Performed by: FAMILY MEDICINE

## 2018-05-10 PROCEDURE — G8417 CALC BMI ABV UP PARAM F/U: HCPCS | Performed by: FAMILY MEDICINE

## 2018-05-10 RX ORDER — ISOTRETINOIN 30 MG/1
30 CAPSULE ORAL 2 TIMES DAILY
Qty: 60 CAPSULE | Refills: 0 | Status: SHIPPED | OUTPATIENT
Start: 2018-05-10 | End: 2018-06-09

## 2018-05-10 RX ORDER — ISOTRETINOIN 30 MG/1
30 CAPSULE ORAL 2 TIMES DAILY
Qty: 60 CAPSULE | Refills: 0 | Status: CANCELLED | OUTPATIENT
Start: 2018-05-10 | End: 2018-06-09

## 2018-05-10 ASSESSMENT — ENCOUNTER SYMPTOMS
ABDOMINAL PAIN: 0
SHORTNESS OF BREATH: 0

## 2018-05-11 DIAGNOSIS — Z79.899 HIGH RISK MEDICATION USE: ICD-10-CM

## 2018-05-11 DIAGNOSIS — R73.9 HYPERGLYCEMIA: ICD-10-CM

## 2018-05-11 DIAGNOSIS — R63.5 WEIGHT GAIN: ICD-10-CM

## 2018-05-11 LAB
ALT SERPL-CCNC: 25 U/L (ref 0–33)
ANION GAP SERPL CALCULATED.3IONS-SCNC: 14 MEQ/L (ref 7–13)
AST SERPL-CCNC: 25 U/L (ref 0–35)
BASOPHILS ABSOLUTE: 0 K/UL (ref 0–0.2)
BASOPHILS RELATIVE PERCENT: 0.5 %
BUN BLDV-MCNC: 13 MG/DL (ref 6–20)
CALCIUM SERPL-MCNC: 8.8 MG/DL (ref 8.6–10.2)
CHLORIDE BLD-SCNC: 100 MEQ/L (ref 98–107)
CHOLESTEROL, TOTAL: 191 MG/DL (ref 0–199)
CO2: 25 MEQ/L (ref 22–29)
CREAT SERPL-MCNC: 0.53 MG/DL (ref 0.5–0.9)
EOSINOPHILS ABSOLUTE: 0.1 K/UL (ref 0–0.7)
EOSINOPHILS RELATIVE PERCENT: 1.2 %
GFR AFRICAN AMERICAN: >60
GFR NON-AFRICAN AMERICAN: >60
GLUCOSE BLD-MCNC: 90 MG/DL (ref 74–109)
HBA1C MFR BLD: 6.1 % (ref 4.8–5.9)
HCT VFR BLD CALC: 38.9 % (ref 37–47)
HDLC SERPL-MCNC: 50 MG/DL (ref 40–59)
HEMOGLOBIN: 12.8 G/DL (ref 12–16)
LDL CHOLESTEROL CALCULATED: 111 MG/DL (ref 0–129)
LYMPHOCYTES ABSOLUTE: 1.5 K/UL (ref 1–4.8)
LYMPHOCYTES RELATIVE PERCENT: 32.3 %
MCH RBC QN AUTO: 27.3 PG (ref 27–31.3)
MCHC RBC AUTO-ENTMCNC: 32.9 % (ref 33–37)
MCV RBC AUTO: 82.9 FL (ref 82–100)
MONOCYTES ABSOLUTE: 0.4 K/UL (ref 0.2–0.8)
MONOCYTES RELATIVE PERCENT: 7.8 %
NEUTROPHILS ABSOLUTE: 2.8 K/UL (ref 1.4–6.5)
NEUTROPHILS RELATIVE PERCENT: 58.2 %
PDW BLD-RTO: 14.6 % (ref 11.5–14.5)
PLATELET # BLD: 229 K/UL (ref 130–400)
POTASSIUM SERPL-SCNC: 4.5 MEQ/L (ref 3.5–5.1)
RBC # BLD: 4.7 M/UL (ref 4.2–5.4)
SODIUM BLD-SCNC: 139 MEQ/L (ref 132–144)
TRIGL SERPL-MCNC: 149 MG/DL (ref 0–200)
TSH SERPL DL<=0.05 MIU/L-ACNC: 0.96 UIU/ML (ref 0.27–4.2)
WBC # BLD: 4.7 K/UL (ref 4.8–10.8)

## 2018-06-01 LAB
CONTROL: NORMAL
PREGNANCY TEST URINE, POC: NEGATIVE

## 2018-08-20 ENCOUNTER — OFFICE VISIT (OUTPATIENT)
Dept: FAMILY MEDICINE CLINIC | Age: 28
End: 2018-08-20
Payer: COMMERCIAL

## 2018-08-20 VITALS
OXYGEN SATURATION: 99 % | DIASTOLIC BLOOD PRESSURE: 72 MMHG | HEIGHT: 58 IN | SYSTOLIC BLOOD PRESSURE: 114 MMHG | HEART RATE: 67 BPM | WEIGHT: 156 LBS | BODY MASS INDEX: 32.75 KG/M2

## 2018-08-20 DIAGNOSIS — N92.1 MENOMETRORRHAGIA: ICD-10-CM

## 2018-08-20 DIAGNOSIS — L70.0 CYSTIC ACNE: ICD-10-CM

## 2018-08-20 DIAGNOSIS — J45.20 MILD INTERMITTENT ASTHMA WITHOUT COMPLICATION: Primary | ICD-10-CM

## 2018-08-20 DIAGNOSIS — M79.7 FIBROMYALGIA: ICD-10-CM

## 2018-08-20 PROCEDURE — 1036F TOBACCO NON-USER: CPT | Performed by: FAMILY MEDICINE

## 2018-08-20 PROCEDURE — 99214 OFFICE O/P EST MOD 30 MIN: CPT | Performed by: FAMILY MEDICINE

## 2018-08-20 PROCEDURE — G8427 DOCREV CUR MEDS BY ELIG CLIN: HCPCS | Performed by: FAMILY MEDICINE

## 2018-08-20 PROCEDURE — G8417 CALC BMI ABV UP PARAM F/U: HCPCS | Performed by: FAMILY MEDICINE

## 2018-08-20 ASSESSMENT — ENCOUNTER SYMPTOMS
SORE THROAT: 0
RHINORRHEA: 0
COUGH: 0
ABDOMINAL PAIN: 0
CHEST TIGHTNESS: 0
SHORTNESS OF BREATH: 0

## 2018-08-20 NOTE — PROGRESS NOTES
Subjective  Roshni Angeles, 28 y.o. female presents today with:  Chief Complaint   Patient presents with    3 Month Follow-Up       Asthma   There is no chest tightness, cough or shortness of breath. This is a chronic problem. The current episode started more than 1 year ago. The problem occurs rarely. The problem has been rapidly improving. Pertinent negatives include no chest pain, fever, rhinorrhea, sneezing or sore throat. Her symptoms are aggravated by change in weather. Her symptoms are alleviated by beta-agonist. She reports significant improvement on treatment. Her past medical history is significant for asthma. Here today for f/u on chronic problems including asthma, allergic rhinitis, cystic acne. She is off of accutane and is happy with her results. She sees Dr. Diana Powers and he started her on Savella about 3-4 weeks ago. She has a f/u appmt with him at the end of Oct. She is having abnormal periods and they are heavy. Review of Systems   Constitutional: Negative for fever. HENT: Negative for rhinorrhea, sneezing and sore throat. Respiratory: Negative for cough and shortness of breath. Cardiovascular: Negative for chest pain. Gastrointestinal: Negative for abdominal pain. Skin: Negative for rash.        Past Medical History:   Diagnosis Date    Abnormal ultrasound of breast 12/2016    Acute bronchitis     Allergic rhinitis     Amenorrhea     Anemia     Arthralgia     Asthma     Bunionette     CTS (carpal tunnel syndrome) 8/28/2014    Cystic acne 09/04/2014    Fatigue     Fibromyalgia     Dr. Stacey Patel High risk medication use     Inflammatory polyarthropathy ([address]     Dr. Diana Powers, on plaquenil    Left breast lump     Low libido     Neck pain     Ovarian cyst 09/2016    right 3.5 cm    Paresthesia of both hands     Prediabetes 02/2018    Sciatica      Past Surgical History:   Procedure Laterality Date    ABDOMINOPLASTY  07/2014    BREAST ENHANCEMENT

## 2018-09-25 DIAGNOSIS — J45.20 MILD INTERMITTENT ASTHMA WITHOUT COMPLICATION: ICD-10-CM

## 2018-09-26 RX ORDER — MONTELUKAST SODIUM 10 MG/1
TABLET ORAL
Qty: 30 TABLET | Refills: 5 | Status: SHIPPED | OUTPATIENT
Start: 2018-09-26 | End: 2019-01-17 | Stop reason: SDUPTHER

## 2018-10-08 ENCOUNTER — E-VISIT (OUTPATIENT)
Dept: INTERNAL MEDICINE | Age: 28
End: 2018-10-08
Payer: COMMERCIAL

## 2018-10-08 DIAGNOSIS — N89.8 VAGINAL DISCHARGE: Primary | ICD-10-CM

## 2018-10-08 PROCEDURE — 98969 PR NONPHYSICIAN ONLINE ASSESSMENT AND MANAGEMENT: CPT | Performed by: PHYSICIAN ASSISTANT

## 2018-10-08 RX ORDER — FLUCONAZOLE 150 MG/1
TABLET ORAL
Qty: 2 TABLET | Refills: 0 | Status: SHIPPED | OUTPATIENT
Start: 2018-10-08 | End: 2019-01-17

## 2019-01-17 ENCOUNTER — OFFICE VISIT (OUTPATIENT)
Dept: OBGYN CLINIC | Age: 29
End: 2019-01-17
Payer: COMMERCIAL

## 2019-01-17 VITALS
DIASTOLIC BLOOD PRESSURE: 62 MMHG | SYSTOLIC BLOOD PRESSURE: 100 MMHG | HEIGHT: 58 IN | WEIGHT: 145 LBS | BODY MASS INDEX: 30.44 KG/M2

## 2019-01-17 DIAGNOSIS — Z01.419 WOMEN'S ANNUAL ROUTINE GYNECOLOGICAL EXAMINATION: ICD-10-CM

## 2019-01-17 DIAGNOSIS — Z11.3 ROUTINE SCREENING FOR STI (SEXUALLY TRANSMITTED INFECTION): ICD-10-CM

## 2019-01-17 DIAGNOSIS — Z11.51 SCREENING FOR HUMAN PAPILLOMAVIRUS: ICD-10-CM

## 2019-01-17 DIAGNOSIS — Z01.419 WOMEN'S ANNUAL ROUTINE GYNECOLOGICAL EXAMINATION: Primary | ICD-10-CM

## 2019-01-17 LAB
CLUE CELLS: NORMAL
TRICHOMONAS PREP: NORMAL
TRICHOMONAS VAGINALIS SCREEN: NEGATIVE
YEAST WET PREP: NORMAL

## 2019-01-17 PROCEDURE — G8484 FLU IMMUNIZE NO ADMIN: HCPCS | Performed by: OBSTETRICS & GYNECOLOGY

## 2019-01-17 PROCEDURE — 99395 PREV VISIT EST AGE 18-39: CPT | Performed by: OBSTETRICS & GYNECOLOGY

## 2019-01-17 ASSESSMENT — PATIENT HEALTH QUESTIONNAIRE - PHQ9
SUM OF ALL RESPONSES TO PHQ QUESTIONS 1-9: 0
SUM OF ALL RESPONSES TO PHQ9 QUESTIONS 1 & 2: 0
1. LITTLE INTEREST OR PLEASURE IN DOING THINGS: 0
SUM OF ALL RESPONSES TO PHQ QUESTIONS 1-9: 0
2. FEELING DOWN, DEPRESSED OR HOPELESS: 0

## 2019-01-17 ASSESSMENT — ENCOUNTER SYMPTOMS
ALLERGIC/IMMUNOLOGIC NEGATIVE: 1
VOMITING: 0
DIARRHEA: 0
BLOOD IN STOOL: 0
RESPIRATORY NEGATIVE: 1
ANAL BLEEDING: 0
NAUSEA: 0
EYES NEGATIVE: 1
ABDOMINAL PAIN: 0
RECTAL PAIN: 0
CONSTIPATION: 0
ABDOMINAL DISTENTION: 0

## 2019-01-23 LAB
C TRACH DNA GENITAL QL NAA+PROBE: NEGATIVE
N. GONORRHOEAE DNA: NEGATIVE

## 2019-01-24 LAB
HPV COMMENT: NORMAL
HPV TYPE 16: NOT DETECTED
HPV TYPE 18: NOT DETECTED
HPVOH (OTHER TYPES): NOT DETECTED

## 2019-03-07 ENCOUNTER — OFFICE VISIT (OUTPATIENT)
Dept: FAMILY MEDICINE CLINIC | Age: 29
End: 2019-03-07
Payer: COMMERCIAL

## 2019-03-07 VITALS
WEIGHT: 147 LBS | BODY MASS INDEX: 30.86 KG/M2 | DIASTOLIC BLOOD PRESSURE: 64 MMHG | TEMPERATURE: 98 F | OXYGEN SATURATION: 98 % | HEIGHT: 58 IN | SYSTOLIC BLOOD PRESSURE: 102 MMHG | HEART RATE: 66 BPM

## 2019-03-07 DIAGNOSIS — R73.03 PREDIABETES: ICD-10-CM

## 2019-03-07 DIAGNOSIS — Z32.00 POSSIBLE PREGNANCY: ICD-10-CM

## 2019-03-07 DIAGNOSIS — B37.9 YEAST INFECTION: Primary | ICD-10-CM

## 2019-03-07 DIAGNOSIS — R30.0 DYSURIA: ICD-10-CM

## 2019-03-07 LAB
BILIRUBIN, POC: NORMAL
BLOOD URINE, POC: NORMAL
CLARITY, POC: CLEAR
COLOR, POC: NORMAL
GLUCOSE URINE, POC: NORMAL
GONADOTROPIN, CHORIONIC (HCG) QUANT: <0.1 MIU/ML
HBA1C MFR BLD: 5.8 % (ref 4.8–5.9)
KETONES, POC: NORMAL
LEUKOCYTE EST, POC: NORMAL
NITRITE, POC: NORMAL
PH, POC: 8
PROTEIN, POC: NORMAL
SPECIFIC GRAVITY, POC: 1
UROBILINOGEN, POC: 0.2

## 2019-03-07 PROCEDURE — 99213 OFFICE O/P EST LOW 20 MIN: CPT | Performed by: NURSE PRACTITIONER

## 2019-03-07 PROCEDURE — G8484 FLU IMMUNIZE NO ADMIN: HCPCS | Performed by: NURSE PRACTITIONER

## 2019-03-07 PROCEDURE — 1036F TOBACCO NON-USER: CPT | Performed by: NURSE PRACTITIONER

## 2019-03-07 PROCEDURE — G8417 CALC BMI ABV UP PARAM F/U: HCPCS | Performed by: NURSE PRACTITIONER

## 2019-03-07 PROCEDURE — 81002 URINALYSIS NONAUTO W/O SCOPE: CPT | Performed by: NURSE PRACTITIONER

## 2019-03-07 PROCEDURE — G8427 DOCREV CUR MEDS BY ELIG CLIN: HCPCS | Performed by: NURSE PRACTITIONER

## 2019-03-07 RX ORDER — FLUCONAZOLE 150 MG/1
150 TABLET ORAL ONCE
Qty: 1 TABLET | Refills: 0 | Status: SHIPPED | OUTPATIENT
Start: 2019-03-07 | End: 2019-03-07

## 2019-03-07 ASSESSMENT — ENCOUNTER SYMPTOMS
NAUSEA: 0
COUGH: 0
SHORTNESS OF BREATH: 0

## 2019-03-08 DIAGNOSIS — Z00.00 ENCOUNTER FOR PREVENTIVE HEALTH EXAMINATION: Primary | ICD-10-CM

## 2019-03-09 LAB — URINE CULTURE, ROUTINE: NORMAL

## 2019-03-11 DIAGNOSIS — Z00.00 ENCOUNTER FOR PREVENTIVE HEALTH EXAMINATION: ICD-10-CM

## 2019-03-11 LAB — HEMOGLOBIN: 13.5 G/DL (ref 12–16)

## 2019-09-13 ENCOUNTER — OFFICE VISIT (OUTPATIENT)
Dept: FAMILY MEDICINE CLINIC | Age: 29
End: 2019-09-13
Payer: COMMERCIAL

## 2019-09-13 VITALS
HEIGHT: 58 IN | BODY MASS INDEX: 31.7 KG/M2 | TEMPERATURE: 98.9 F | OXYGEN SATURATION: 99 % | SYSTOLIC BLOOD PRESSURE: 100 MMHG | WEIGHT: 151 LBS | DIASTOLIC BLOOD PRESSURE: 60 MMHG | HEART RATE: 69 BPM

## 2019-09-13 DIAGNOSIS — J45.21 MILD INTERMITTENT ASTHMA WITH EXACERBATION: ICD-10-CM

## 2019-09-13 DIAGNOSIS — J20.9 ACUTE BRONCHITIS, UNSPECIFIED ORGANISM: ICD-10-CM

## 2019-09-13 DIAGNOSIS — J45.20 MILD INTERMITTENT ASTHMA WITHOUT COMPLICATION: ICD-10-CM

## 2019-09-13 DIAGNOSIS — R73.03 PREDIABETES: Primary | ICD-10-CM

## 2019-09-13 PROCEDURE — 99213 OFFICE O/P EST LOW 20 MIN: CPT | Performed by: NURSE PRACTITIONER

## 2019-09-13 PROCEDURE — G8417 CALC BMI ABV UP PARAM F/U: HCPCS | Performed by: NURSE PRACTITIONER

## 2019-09-13 PROCEDURE — G8427 DOCREV CUR MEDS BY ELIG CLIN: HCPCS | Performed by: NURSE PRACTITIONER

## 2019-09-13 PROCEDURE — 1036F TOBACCO NON-USER: CPT | Performed by: NURSE PRACTITIONER

## 2019-09-13 RX ORDER — ALBUTEROL SULFATE 90 UG/1
2 AEROSOL, METERED RESPIRATORY (INHALATION) EVERY 6 HOURS PRN
Qty: 1 INHALER | Refills: 5 | Status: SHIPPED | OUTPATIENT
Start: 2019-09-13 | End: 2020-02-06 | Stop reason: CLARIF

## 2019-09-13 RX ORDER — AZITHROMYCIN 250 MG/1
TABLET, FILM COATED ORAL
Qty: 1 PACKET | Refills: 0 | Status: SHIPPED | OUTPATIENT
Start: 2019-09-13 | End: 2020-02-06 | Stop reason: CLARIF

## 2019-09-13 RX ORDER — MONTELUKAST SODIUM 10 MG/1
10 TABLET ORAL NIGHTLY
Qty: 30 TABLET | Refills: 5 | Status: SHIPPED | OUTPATIENT
Start: 2019-09-13 | End: 2020-02-06 | Stop reason: CLARIF

## 2019-09-13 RX ORDER — METHYLPREDNISOLONE 4 MG/1
TABLET ORAL
Qty: 21 TABLET | Refills: 0 | Status: SHIPPED | OUTPATIENT
Start: 2019-09-13 | End: 2019-09-19

## 2019-09-13 ASSESSMENT — ENCOUNTER SYMPTOMS
ABDOMINAL PAIN: 0
SORE THROAT: 1
SHORTNESS OF BREATH: 0
COUGH: 0
HOARSE VOICE: 1
DIFFICULTY BREATHING: 0
CHEST TIGHTNESS: 1
SINUS PRESSURE: 0
WHEEZING: 0
TROUBLE SWALLOWING: 1
SINUS PAIN: 0
RHINORRHEA: 0

## 2019-09-13 NOTE — PROGRESS NOTES
Diagnosis Date Noted    Prediabetes 2018    Amenorrhea     Low libido     High risk medication use     Abnormal ultrasound of breast 2016    Left breast lump     Fibromyalgia     Inflammatory polyarthropathy (H[facility])     Ovarian cyst 2016    Paresthesia of both hands     Allergic rhinitis     Asthma     Cystic acne 2014     Past Surgical History:   Procedure Laterality Date    ABDOMINOPLASTY  2014    BREAST ENHANCEMENT SURGERY  2014     SECTION  2006     SECTION  2004    LIPOSUCTION  2014    TUBAL LIGATION      WRIST SURGERY Right ,     right carpel tunnel release x , Dr. Bubba Arce     Family History   Problem Relation Age of Onset    Arthritis Mother         Rheumatoid Arthritis and fibromyalgia    Arthritis Father     High Blood Pressure Maternal Grandfather     Diabetes Maternal Grandfather     Heart Disease Maternal Grandfather     No Known Problems Paternal Grandfather     No Known Problems Paternal Grandmother     No Known Problems Maternal Grandmother     No Known Problems Brother     No Known Problems Sister     No Known Problems Other     Breast Cancer Neg Hx     Cancer Neg Hx     Colon Cancer Neg Hx     Eclampsia Neg Hx     Hypertension Neg Hx     Ovarian Cancer Neg Hx      Labor Neg Hx     Spont Abortions Neg Hx     Stroke Neg Hx      Social History     Socioeconomic History    Marital status:      Spouse name: None    Number of children: 2    Years of education: None    Highest education level: None   Occupational History    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   Social Needs    Financial resource strain: None    Food insecurity:     Worry: None     Inability: None    Transportation needs:     Medical: None     Non-medical: None   Tobacco Use    Smoking status: Never Smoker    Smokeless tobacco: Never Used   Substance and Sexual Activity    Alcohol use:  Yes     Alcohol/week: 4.2 standard drinks Objective  Vitals:    09/13/19 0905   BP: 100/60   Pulse: 69   Temp: 98.9 °F (37.2 °C)   SpO2: 99%   Weight: 151 lb (68.5 kg)   Height: 4' 10\" (1.473 m)     Physical Exam   Constitutional: She is oriented to person, place, and time. Vital signs are normal. She appears well-developed and well-nourished. HENT:   Head: Normocephalic and atraumatic. Right Ear: Tympanic membrane, external ear and ear canal normal.   Left Ear: Tympanic membrane, external ear and ear canal normal.   Nose: Nose normal. Right sinus exhibits no maxillary sinus tenderness and no frontal sinus tenderness. Left sinus exhibits no maxillary sinus tenderness and no frontal sinus tenderness. Mouth/Throat: Uvula is midline and mucous membranes are normal. Posterior oropharyngeal erythema present. Tonsils are 0 on the right. Tonsils are 0 on the left. No tonsillar exudate. Eyes: Lids are normal.   Cardiovascular: Normal rate, regular rhythm, S1 normal, S2 normal and normal heart sounds. Exam reveals no gallop and no friction rub. No murmur heard. Pulmonary/Chest: Effort normal and breath sounds normal. No stridor. No tachypnea and no bradypnea. No respiratory distress. She has no decreased breath sounds. She has no wheezes. She has no rhonchi. She has no rales. She exhibits no tenderness. Musculoskeletal: Normal range of motion. Lymphadenopathy:        Head (right side): No submental, no submandibular and no tonsillar adenopathy present. Head (left side): No submental, no submandibular and no tonsillar adenopathy present. She has no cervical adenopathy. Neurological: She is alert and oriented to person, place, and time. GCS eye subscore is 4. GCS verbal subscore is 5. GCS motor subscore is 6. Skin: Skin is warm, dry and intact. Capillary refill takes less than 2 seconds. No erythema. Psychiatric: She has a normal mood and affect.  Her speech is normal and behavior is normal. Judgment and thought content normal. expresses understanding and desires to proceed. Close follow up to evaluate treatment results and for coordination of care. I have reviewed the patient's medical history in detail and updated the computerized patient record. As always, patient is advised that if symptoms worsen in any way they will proceed to the nearest emergency room. Return in about 6 months (around 3/13/2020) for asthma.     Dionicio Toledo, APRN - CNP

## 2020-02-06 ENCOUNTER — OFFICE VISIT (OUTPATIENT)
Dept: OBGYN CLINIC | Age: 30
End: 2020-02-06
Payer: COMMERCIAL

## 2020-02-06 VITALS — WEIGHT: 143 LBS | BODY MASS INDEX: 29.89 KG/M2 | DIASTOLIC BLOOD PRESSURE: 70 MMHG | SYSTOLIC BLOOD PRESSURE: 118 MMHG

## 2020-02-06 DIAGNOSIS — Z11.51 SCREENING FOR HUMAN PAPILLOMAVIRUS: ICD-10-CM

## 2020-02-06 DIAGNOSIS — Z01.419 PAP SMEAR, AS PART OF ROUTINE GYNECOLOGICAL EXAMINATION: ICD-10-CM

## 2020-02-06 DIAGNOSIS — Z11.3 SCREEN FOR SEXUALLY TRANSMITTED DISEASES: ICD-10-CM

## 2020-02-06 PROCEDURE — 1036F TOBACCO NON-USER: CPT | Performed by: OBSTETRICS & GYNECOLOGY

## 2020-02-06 PROCEDURE — 99213 OFFICE O/P EST LOW 20 MIN: CPT | Performed by: OBSTETRICS & GYNECOLOGY

## 2020-02-06 PROCEDURE — G8417 CALC BMI ABV UP PARAM F/U: HCPCS | Performed by: OBSTETRICS & GYNECOLOGY

## 2020-02-06 PROCEDURE — 99395 PREV VISIT EST AGE 18-39: CPT | Performed by: OBSTETRICS & GYNECOLOGY

## 2020-02-06 PROCEDURE — G8427 DOCREV CUR MEDS BY ELIG CLIN: HCPCS | Performed by: OBSTETRICS & GYNECOLOGY

## 2020-02-06 PROCEDURE — G8484 FLU IMMUNIZE NO ADMIN: HCPCS | Performed by: OBSTETRICS & GYNECOLOGY

## 2020-02-06 ASSESSMENT — ENCOUNTER SYMPTOMS
BLOOD IN STOOL: 0
ABDOMINAL PAIN: 0
RESPIRATORY NEGATIVE: 1
EYES NEGATIVE: 1
DIARRHEA: 0
VOMITING: 0
ANAL BLEEDING: 0
NAUSEA: 0
RECTAL PAIN: 0
CONSTIPATION: 0
ABDOMINAL DISTENTION: 0
ALLERGIC/IMMUNOLOGIC NEGATIVE: 1

## 2020-02-06 NOTE — PROGRESS NOTES
Marital status:      Spouse name: Not on file    Number of children: 2    Years of education: Not on file    Highest education level: Not on file   Occupational History    Occupation: [address] [employer] Financial resource strain: Not on file    Food insecurity:     Worry: Not on file     Inability: Not on file    Transportation needs:     Medical: Not on file     Non-medical: Not on file   Tobacco Use    Smoking status: Never Smoker    Smokeless tobacco: Never Used   Substance and Sexual Activity    Alcohol use:  Yes     Alcohol/week: 4.2 standard drinks     Types: 5 Standard drinks or equivalent per week     Comment: social    Drug use: No    Sexual activity: Yes     Partners: Male   Lifestyle    Physical activity:     Days per week: Not on file     Minutes per session: Not on file    Stress: Not on file   Relationships    Social connections:     Talks on phone: Not on file     Gets together: Not on file     Attends Quaker service: Not on file     Active member of club or organization: Not on file     Attends meetings of clubs or organizations: Not on file     Relationship status: Not on file    Intimate partner violence:     Fear of current or ex partner: Not on file     Emotionally abused: Not on file     Physically abused: Not on file     Forced sexual activity: Not on file   Other Topics Concern    Not on file   Social History Narrative    Not on file     Family History   Problem Relation Age of Onset    Arthritis Mother         Rheumatoid Arthritis and fibromyalgia    Arthritis Father     High Blood Pressure Maternal Grandfather     Diabetes Maternal Grandfather     Heart Disease Maternal Grandfather     No Known Problems Paternal Grandfather     No Known Problems Paternal Grandmother     No Known Problems Maternal Grandmother     No Known Problems Brother     No Known Problems Sister     No Known Problems Other     Breast Cancer Neg Hx     Cancer Neg Hx  Colon Cancer Neg Hx     Eclampsia Neg Hx     Hypertension Neg Hx     Ovarian Cancer Neg Hx      Labor Neg Hx     Spont Abortions Neg Hx     Stroke Neg Hx        Review of Systems   Constitutional: Negative. Negative for activity change, appetite change, chills, diaphoresis, fatigue, fever and unexpected weight change. HENT: Negative. Eyes: Negative. Respiratory: Negative. Cardiovascular: Negative. Gastrointestinal: Negative for abdominal distention, abdominal pain, anal bleeding, blood in stool, constipation, diarrhea, nausea, rectal pain and vomiting. Endocrine: Negative. Genitourinary: Negative for decreased urine volume, difficulty urinating, dyspareunia, dysuria, enuresis, flank pain, frequency, genital sores, hematuria, menstrual problem, pelvic pain, urgency, vaginal bleeding, vaginal discharge and vaginal pain. Musculoskeletal: Negative. Skin: Negative. Allergic/Immunologic: Negative. Neurological: Negative. Hematological: Negative. Psychiatric/Behavioral: Negative. Objective:     Physical Exam  Constitutional:       Appearance: She is well-developed. HENT:      Head: Normocephalic. Neck:      Musculoskeletal: Normal range of motion and neck supple. Thyroid: No thyromegaly. Cardiovascular:      Rate and Rhythm: Normal rate and regular rhythm. Heart sounds: Normal heart sounds. Pulmonary:      Effort: Pulmonary effort is normal. No respiratory distress. Breath sounds: Normal breath sounds. No wheezing or rales. Chest:      Chest wall: No tenderness. Breasts:         Right: No mass, nipple discharge, skin change or tenderness. Left: Mass and skin change present. No nipple discharge or tenderness. Abdominal:      General: Bowel sounds are normal. There is no distension. Palpations: Abdomen is soft. There is no mass. Tenderness: There is no abdominal tenderness. There is no guarding or rebound. Hernia: There is no hernia in the right inguinal area or left inguinal area. Genitourinary:     Labia:         Right: No rash, tenderness, lesion or injury. Left: No rash, tenderness, lesion or injury. Vagina: Normal. No signs of injury and foreign body. No vaginal discharge, erythema, tenderness or bleeding. Cervix: No cervical motion tenderness, discharge or friability. Uterus: Not deviated, not enlarged, not fixed and not tender. Adnexa:         Right: No mass, tenderness or fullness. Left: No mass, tenderness or fullness. Rectum: Normal.   Musculoskeletal: Normal range of motion. General: No tenderness. Lymphadenopathy:      Cervical: No cervical adenopathy. Skin:     General: Skin is warm and dry. Coloration: Skin is not pale. Findings: No erythema or rash. Neurological:      Mental Status: She is alert and oriented to person, place, and time. Psychiatric:         Behavior: Behavior normal.         Thought Content: Thought content normal.         Judgment: Judgment normal.         Assessment:       Diagnosis Orders   1. Pap smear, as part of routine gynecological examination  PAP SMEAR   2. Screening for human papillomavirus  PAP SMEAR   3. Screen for sexually transmitted diseases  Wet Prep, Genital    C.trachomatis N.gonorrhoeae DNA        Plan:      Medications placedthis encounter:  No orders of the defined types were placed in this encounter. Orders placedthis encounter:  Orders Placed This Encounter   Procedures    Wet Prep, Genital     Standing Status:   Future     Standing Expiration Date:   2/6/2021    C.trachomatis N.gonorrhoeae DNA     Standing Status:   Future     Standing Expiration Date:   2/6/2021     Order Specific Question:   Source: Answer:   Cervical    PAP SMEAR     Patient History:    Patient's last menstrual period was 01/21/2020.   OBGYN Status: Having periods  Past Surgical History:  07/2014: ABDOMINOPLASTY  2014: BREAST ENHANCEMENT SURGERY  2006:  SECTION  2004:  SECTION  2014: LIPOSUCTION  No date: TUBAL LIGATION  , 2015: WRIST SURGERY; Right      Comment:  right carpel tunnel release x , Dr. Grant Bachelor History    Tobacco Use      Smoking status: Never Smoker      Smokeless tobacco: Never Used       Standing Status:   Future     Standing Expiration Date:   2021     Order Specific Question:   Collection Type     Answer: Thin Prep     Order Specific Question:   Prior Abnormal Pap Test     Answer:   No     Order Specific Question:   Screening or Diagnostic     Answer:   Screening     Order Specific Question:   HPV Requested? Answer:   Yes     Comments:        Order Specific Question:   High Risk Patient     Answer:   N/A         Follow up:  Return in about 1 year (around 2021) for Annual.   Repeat Annual GYN exam every 1 year. Cervical Cytology exam starts age 24. If Negative Cytology;  follow-up screening per current guidelines. Mammograms yearly starting at age 36. Calcium and Vitamin D dosing reviewed ( age appropriate ). Colonoscopy and bone density screening discussed ( age appropriate ). Birth control and STD prevention discussed ( age appropriate ). Gardisil counseling completed for all patients 7-33 yo. Routine health maintenance ( per PCP and guidelines ).

## 2020-02-12 LAB
C TRACH DNA GENITAL QL NAA+PROBE: NEGATIVE
N. GONORRHOEAE DNA: NEGATIVE

## 2020-03-13 ENCOUNTER — OFFICE VISIT (OUTPATIENT)
Dept: FAMILY MEDICINE CLINIC | Age: 30
End: 2020-03-13
Payer: COMMERCIAL

## 2020-03-13 VITALS
HEIGHT: 58 IN | TEMPERATURE: 97.8 F | BODY MASS INDEX: 29.6 KG/M2 | SYSTOLIC BLOOD PRESSURE: 96 MMHG | HEART RATE: 64 BPM | OXYGEN SATURATION: 99 % | DIASTOLIC BLOOD PRESSURE: 66 MMHG | WEIGHT: 141 LBS

## 2020-03-13 PROCEDURE — 1036F TOBACCO NON-USER: CPT | Performed by: NURSE PRACTITIONER

## 2020-03-13 PROCEDURE — 99214 OFFICE O/P EST MOD 30 MIN: CPT | Performed by: NURSE PRACTITIONER

## 2020-03-13 PROCEDURE — G8427 DOCREV CUR MEDS BY ELIG CLIN: HCPCS | Performed by: NURSE PRACTITIONER

## 2020-03-13 PROCEDURE — G8484 FLU IMMUNIZE NO ADMIN: HCPCS | Performed by: NURSE PRACTITIONER

## 2020-03-13 PROCEDURE — G8417 CALC BMI ABV UP PARAM F/U: HCPCS | Performed by: NURSE PRACTITIONER

## 2020-03-13 RX ORDER — DIPHENHYDRAMINE HCL 25 MG
25 CAPSULE ORAL EVERY 6 HOURS PRN
COMMUNITY
End: 2020-03-27 | Stop reason: ALTCHOICE

## 2020-03-13 RX ORDER — TRIAMCINOLONE ACETONIDE 0.25 MG/G
CREAM TOPICAL
Qty: 1 TUBE | Refills: 0 | Status: SHIPPED | OUTPATIENT
Start: 2020-03-13 | End: 2020-11-10 | Stop reason: ALTCHOICE

## 2020-03-13 RX ORDER — MONTELUKAST SODIUM 10 MG/1
10 TABLET ORAL NIGHTLY
Qty: 30 TABLET | Refills: 5 | Status: SHIPPED | OUTPATIENT
Start: 2020-03-13 | End: 2021-02-08

## 2020-03-13 SDOH — ECONOMIC STABILITY: TRANSPORTATION INSECURITY
IN THE PAST 12 MONTHS, HAS THE LACK OF TRANSPORTATION KEPT YOU FROM MEDICAL APPOINTMENTS OR FROM GETTING MEDICATIONS?: NO

## 2020-03-13 SDOH — ECONOMIC STABILITY: TRANSPORTATION INSECURITY
IN THE PAST 12 MONTHS, HAS LACK OF TRANSPORTATION KEPT YOU FROM MEETINGS, WORK, OR FROM GETTING THINGS NEEDED FOR DAILY LIVING?: NO

## 2020-03-13 SDOH — ECONOMIC STABILITY: FOOD INSECURITY: WITHIN THE PAST 12 MONTHS, YOU WORRIED THAT YOUR FOOD WOULD RUN OUT BEFORE YOU GOT MONEY TO BUY MORE.: NEVER TRUE

## 2020-03-13 SDOH — ECONOMIC STABILITY: INCOME INSECURITY: HOW HARD IS IT FOR YOU TO PAY FOR THE VERY BASICS LIKE FOOD, HOUSING, MEDICAL CARE, AND HEATING?: NOT HARD AT ALL

## 2020-03-13 SDOH — ECONOMIC STABILITY: FOOD INSECURITY: WITHIN THE PAST 12 MONTHS, THE FOOD YOU BOUGHT JUST DIDN'T LAST AND YOU DIDN'T HAVE MONEY TO GET MORE.: NEVER TRUE

## 2020-03-13 ASSESSMENT — ENCOUNTER SYMPTOMS
HOARSE VOICE: 0
HEMOPTYSIS: 0
RHINORRHEA: 0
COUGH: 0
PHOTOPHOBIA: 0
DIFFICULTY BREATHING: 1
WHEEZING: 0
SHORTNESS OF BREATH: 1
FREQUENT THROAT CLEARING: 0
CHEST TIGHTNESS: 0
TROUBLE SWALLOWING: 0

## 2020-03-13 ASSESSMENT — PATIENT HEALTH QUESTIONNAIRE - PHQ9
2. FEELING DOWN, DEPRESSED OR HOPELESS: 0
1. LITTLE INTEREST OR PLEASURE IN DOING THINGS: 0
SUM OF ALL RESPONSES TO PHQ9 QUESTIONS 1 & 2: 0
SUM OF ALL RESPONSES TO PHQ QUESTIONS 1-9: 0
SUM OF ALL RESPONSES TO PHQ QUESTIONS 1-9: 0

## 2020-03-13 NOTE — PROGRESS NOTES
Subjective  Chief Complaint   Patient presents with    6 Month Follow-Up     pre diabetes    Follow-Up from Hospital     hospital f/u went to [facility] [place] states that her eyes were swollen and had a rash on her neck. was put on benadryl prednisone. states that the steroid gave her a headache. does not know what gave her the reaction.  Flu Vaccine     would like to discuss flu vaccine       Asthma   She complains of difficulty breathing and shortness of breath. There is no chest tightness, cough, frequent throat clearing, hemoptysis, hoarse voice or wheezing. This is a new problem. The current episode started more than 1 month ago (for the past 6 months). Episode frequency: only at night. The problem has been unchanged. Pertinent negatives include no chest pain, dyspnea on exertion, ear pain, fever, headaches, malaise/fatigue, myalgias, postnasal drip, rhinorrhea, sneezing or trouble swallowing. Exacerbated by: sleeping. Her symptoms are alleviated by beta-agonist (using this nightly). She reports significant improvement on treatment. There are no known risk factors for lung disease. Her past medical history is significant for asthma. There is no history of bronchitis, COPD, emphysema or pneumonia. Of note, she had previously been on singulair but stopped taking it sometime in the last 6-12 months. Not sure why. Pt here for 6 month check up. Was at [facility] urgent care last week. Woke up with facial swelling and redness which was traveling down onto her neck. The rash itches and burns. She was given prednisone and benadryl. Did not take prednisone because she took 2 tablets and developed a headache. She has been taking the benadryl every night. Symptoms are improving. Benadryl does help her to sleep at night. She is having some nasal congestion and pressure in her head. Unsure what she was reacting to. Has lost 10 pounds since she was seen here last time in September.  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   Social Needs    Financial resource strain: Not hard at all   [place] insecurity     Worry: Never true     Inability: Never true    Transportation needs     Medical: No     Non-medical: No   Tobacco Use    Smoking status: Never Smoker    Smokeless tobacco: Never Used   Substance and Sexual Activity    Alcohol use: Yes     Alcohol/week: 4.2 standard drinks     Types: 5 Standard drinks or equivalent per week     Comment: social    Drug use: No    Sexual activity: Yes     Partners: Male   Lifestyle    Physical activity     Days per week: None     Minutes per session: None    Stress: None   Relationships    Social connections     Talks on phone: None     Gets together: None     Attends [employer] service: None     Active member of club or organization: None     Attends meetings of clubs or organizations: None     Relationship status: None    Intimate partner violence     Fear of current or ex partner: None     Emotionally abused: None     Physically abused: None     Forced sexual activity: None   Other Topics Concern    None   Social History Narrative    None     Current Outpatient Medications on File Prior to Visit   Medication Sig Dispense Refill    diphenhydrAMINE (BENADRYL) 25 MG capsule Take 25 mg by mouth every 6 hours as needed for Itching       No current facility-administered medications on file prior to visit.       Allergies   Allergen Reactions    Pcn [Penicillins] Hives, Shortness Of Breath, Anaphylaxis, Itching and Rash    Ultram [Tramadol Hcl] Other (See Comments)     Made her feel funny in her hand       Review of Systems   Constitutional: Negative for chills, diaphoresis, fatigue, fever and malaise/fatigue. HENT: Negative for congestion, ear pain, hoarse voice, postnasal drip, rhinorrhea, sneezing and trouble swallowing. Eyes: Negative for photophobia and visual disturbance. Respiratory: Positive for shortness of breath. Negative for cough, hemoptysis and wheezing. Cardiovascular: Negative for chest pain, dyspnea on exertion, palpitations and leg swelling. Genitourinary: Negative for dysuria and frequency. Musculoskeletal: Negative for arthralgias and myalgias. Skin: Positive for rash. Neurological: Negative for dizziness and headaches. Psychiatric/Behavioral: Negative for dysphoric mood. The patient is not nervous/anxious. Objective  Vitals:    03/13/20 0924   BP: 96/66   Site: Left Upper Arm   Position: Sitting   Cuff Size: Medium Adult   Pulse: 64   Temp: 97.8 °F (36.6 °C)   TempSrc: Tympanic   SpO2: 99%   Weight: 141 lb (64 kg)   Height: 4' 10\" (1.473 m)     Physical Exam  Constitutional:       General: She is not in acute distress. Appearance: She is normal weight. She is not ill-appearing, toxic-appearing or diaphoretic. HENT:      Head: Normocephalic and atraumatic. Right Ear: Hearing, tympanic membrane, ear canal and external ear normal. There is no impacted cerumen. Left Ear: Hearing, tympanic membrane, ear canal and external ear normal. There is no impacted cerumen. Nose: Nose normal. No congestion or rhinorrhea. Mouth/Throat:      Mouth: Mucous membranes are moist.      Pharynx: Oropharynx is clear. No oropharyngeal exudate or posterior oropharyngeal erythema. Eyes:      Extraocular Movements: Extraocular movements intact. Conjunctiva/sclera: Conjunctivae normal.      Pupils: Pupils are equal, round, and reactive to light. Neck:      Musculoskeletal: Normal range of motion and neck supple. No muscular tenderness. Cardiovascular:      Rate and Rhythm: Normal rate and regular rhythm.

## 2020-03-27 ENCOUNTER — VIRTUAL VISIT (OUTPATIENT)
Dept: FAMILY MEDICINE CLINIC | Age: 30
End: 2020-03-27
Payer: COMMERCIAL

## 2020-03-27 VITALS — BODY MASS INDEX: 29.39 KG/M2 | WEIGHT: 140 LBS | HEIGHT: 58 IN

## 2020-03-27 PROCEDURE — 99442 PR PHYS/QHP TELEPHONE EVALUATION 11-20 MIN: CPT | Performed by: NURSE PRACTITIONER

## 2020-03-27 RX ORDER — FLUTICASONE PROPIONATE 50 MCG
1 SPRAY, SUSPENSION (ML) NASAL DAILY
Qty: 1 BOTTLE | Refills: 0 | Status: SHIPPED | OUTPATIENT
Start: 2020-03-27 | End: 2020-10-09 | Stop reason: SDUPTHER

## 2020-03-27 RX ORDER — LORATADINE 10 MG/1
10 TABLET ORAL DAILY
Qty: 30 TABLET | Refills: 2 | Status: SHIPPED | OUTPATIENT
Start: 2020-03-27 | End: 2020-11-10

## 2020-03-27 ASSESSMENT — ENCOUNTER SYMPTOMS
SINUS PAIN: 0
PHOTOPHOBIA: 0
CHEST TIGHTNESS: 1
COUGH: 0
SINUS PRESSURE: 0
RHINORRHEA: 0
SORE THROAT: 0
SHORTNESS OF BREATH: 0

## 2020-03-27 NOTE — PROGRESS NOTES
3/27/2020    TELEHEALTH EVALUATION -- Audio/Visual (During MTVQW-57 public health emergency)    Due to [place] 19 outbreak, patient's office visit was converted to a virtual visit. Patient was contacted and agreed to proceed with a virtual visit via Telephone Visit  The risks and benefits of converting to a virtual visit were discussed in light of the current infectious disease epidemic. Patient also understood that insurance coverage and co-pays are up to their individual insurance plans. HPI:    Tamie Wu (:  1990) has requested an audio/video evaluation for the following concern(s):    Pt calling in for f/u on rash and asthma. During the day she is doing fine. Minimal rash/itching during the day. Rash is better, no further redness. But still will itch her at night. She has been using the kenalog cream twice daily. Breathing is very well controlled during the day. However at night she will wake up with her nose and head feeling tight and chest tightness, not that bad in her chest. Will wake her up and she will use her albuterol inhaler which provides her with immediate relief. She has started taking her singulair again; however is taking it at 8 AM every morning. Review of Systems   Constitutional: Negative for chills, diaphoresis, fatigue and fever. HENT: Negative for congestion, ear pain, rhinorrhea, sinus pressure, sinus pain, sneezing and sore throat. Eyes: Negative for photophobia and visual disturbance. Respiratory: Positive for chest tightness (occasionally at night). Negative for cough and shortness of breath. Cardiovascular: Negative for chest pain, palpitations and leg swelling. Skin: Positive for rash. Neurological: Negative for dizziness and headaches. Psychiatric/Behavioral: Negative for dysphoric mood. The patient is not nervous/anxious. Prior to Visit Medications    Medication Sig Taking?  Authorizing Provider   fluticasone (FLONASE) 50 MCG/ACT nasal spray 1 spray by Each Nostril route daily Yes Huntertom Khan APRN - CNP   loratadine (CLARITIN) 10 MG tablet Take 1 tablet by mouth daily Yes Huntertom Khan APRN - CNP   montelukast (SINGULAIR) 10 MG tablet Take 1 tablet by mouth nightly Yes Hunter Leap, APRN - CNP   triamcinolone (KENALOG) 0.025 % cream Apply topically 2 times daily. Yes HunterRITA Adam - CNP       Social History     Tobacco Use    Smoking status: Never Smoker    Smokeless tobacco: Never Used   Substance Use Topics    Alcohol use: Yes     Alcohol/week: 4.2 standard drinks     Types: 5 Standard drinks or equivalent per week     Comment: social    Drug use: No        Allergies   Allergen Reactions    Pcn [Penicillins] Hives, Shortness Of Breath, Anaphylaxis, Itching and Rash    Ultram [Tramadol Hcl] Other (See Comments)     Made her feel funny in her hand   ,   Past Medical History:   Diagnosis Date    Abnormal ultrasound of breast 2016    Acute bronchitis     Allergic rhinitis     Allergic rhinitis     Amenorrhea     Anemia     Arthralgia     Asthma     Bunionette     CTS (carpal tunnel syndrome) 2014    Cystic acne 2014    Fatigue     Fibromyalgia     Dr. Effie Brown High risk medication use     Inflammatory polyarthropathy ([address]     Dr. Maggy Goodman, on plaquenil    Left breast lump     Low libido     Neck pain     Ovarian cyst 2016    right 3.5 cm    Paresthesia of both hands     Prediabetes 2018    Sciatica    ,   Past Surgical History:   Procedure Laterality Date    ABDOMINOPLASTY  2014    BREAST ENHANCEMENT SURGERY  2014     SECTION  2006     SECTION  2004    LIPOSUCTION  2014    NOSE SURGERY      TUBAL LIGATION      WRIST SURGERY Right ,     right carpel tunnel release x , Dr. Long Wu   ,   Social History     Tobacco Use    Smoking status: Never Smoker    Smokeless tobacco: Never Used   Substance Use Topics    Alcohol use:  Yes 3. Allergic rhinitis, unspecified seasonality, unspecified trigger  fluticasone (FLONASE) 50 MCG/ACT nasal spray     Stop benadryl. Start claritin daily in the morning. Start taking singulair nightly as opposed to in the morning. Flonase nightly before bed. Total time spent was 20 minutes. F/u in 2 weeks to recheck via video visit. Return in about 2 weeks (around 4/10/2020) for f/u rash/asthma, vv. An  electronic signature was used to authenticate this note. --RITA Santacruz - CNP on 3/27/2020 at 8:22 AM        Pursuant to the emergency declaration under the [facility], 1135 waiver authority and the [employer] and [employer] Act, this Virtual  Visit was conducted, with patient's consent, to reduce the patient's risk of exposure to COVID-19 and provide continuity of care for an established patient. Services were provided through a video synchronous discussion virtually to substitute for in-person clinic visit.

## 2020-04-10 ENCOUNTER — VIRTUAL VISIT (OUTPATIENT)
Dept: FAMILY MEDICINE CLINIC | Age: 30
End: 2020-04-10
Payer: COMMERCIAL

## 2020-04-10 VITALS — BODY MASS INDEX: 29.39 KG/M2 | HEIGHT: 58 IN | WEIGHT: 140 LBS

## 2020-04-10 PROCEDURE — 99213 OFFICE O/P EST LOW 20 MIN: CPT | Performed by: NURSE PRACTITIONER

## 2020-04-10 PROCEDURE — G8427 DOCREV CUR MEDS BY ELIG CLIN: HCPCS | Performed by: NURSE PRACTITIONER

## 2020-04-10 ASSESSMENT — ENCOUNTER SYMPTOMS
CHEST TIGHTNESS: 0
WHEEZING: 0
SHORTNESS OF BREATH: 0
COUGH: 0
BACK PAIN: 0

## 2020-04-10 NOTE — PROGRESS NOTES
4/10/2020    TELEHEALTH EVALUATION -- Audio/Visual (During TSXYO-27 public health emergency)    Due to Awanda Handsome 19 outbreak, patient's office visit was converted to a virtual visit. Patient was contacted and agreed to proceed with a virtual visit via [email or web address] me  The risks and benefits of converting to a virtual visit were discussed in light of the current infectious disease epidemic. Patient also understood that insurance coverage and co-pays are up to their individual insurance plans. HPI:    Troywade August (:  1990) has requested an audio/video evaluation for the following concern(s):    F/u dermatitis and asthma. Dermatitis on rash is now completely gone. No further issues with that. Using singulair at night now along with flonase, using claritin in the morning. Breathing has been much better. Has not needed albuterol inhaler since our last visit. Has been been awakened at night as a result of her breathing. Feels very well controlled. Review of Systems   Constitutional: Negative for chills, diaphoresis, fatigue and fever. HENT: Negative for congestion and ear pain. Respiratory: Negative for cough, chest tightness, shortness of breath and wheezing. Cardiovascular: Negative for chest pain, palpitations and leg swelling. Musculoskeletal: Negative for arthralgias and back pain. Neurological: Negative for dizziness and headaches. Psychiatric/Behavioral: Negative for dysphoric mood. The patient is not nervous/anxious. Prior to Visit Medications    Medication Sig Taking?  Authorizing Provider   fluticasone (FLONASE) 50 MCG/ACT nasal spray 1 spray by Each Nostril route daily Yes RITA Galvan CNP   loratadine (CLARITIN) 10 MG tablet Take 1 tablet by mouth daily Yes RITA Galvan CNP   montelukast (SINGULAIR) 10 MG tablet Take 1 tablet by mouth nightly Yes RITA Galvan CNP   triamcinolone (KENALOG) 0.025 % cream Apply topically 2 times Heart Disease Maternal Grandfather     No Known Problems Paternal Grandfather     No Known Problems Paternal Grandmother     No Known Problems Maternal Grandmother     No Known Problems Brother     No Known Problems Sister     No Known Problems Other     Breast Cancer Neg Hx     Cancer Neg Hx     Colon Cancer Neg Hx     Eclampsia Neg Hx     Hypertension Neg Hx     Ovarian Cancer Neg Hx      Labor Neg Hx     Spont Abortions Neg Hx     Stroke Neg Hx    ,   Immunization History   Administered Date(s) Administered    Influenza Virus Vaccine 2018    Influenza, Quadv, IM, PF (6 mo and older Fluzone, Flulaval, Fluarix, and 3 yrs and older Afluria) 2016    PPD Test 2013   ,   Health Maintenance   Topic Date Due    Varicella vaccine (1 of 2 - 2-dose childhood series) 1990    Pneumococcal 0-64 years Vaccine (1 of 1 - PPSV23) 1995    HIV screen  2004    DTaP/Tdap/Td vaccine (1 - Tdap) 2008    A1C test (Diabetic or Prediabetic)  2020    Flu vaccine (Season Ended) 2020    Cervical cancer screen  2025    Hepatitis A vaccine  Aged Out    Hepatitis B vaccine  Aged Out    Hib vaccine  Aged Out    Meningococcal (ACWY) vaccine  Aged Out       PHYSICAL EXAMINATION:  [ INSTRUCTIONS:  \"[x]\" Indicates a positive item  \"[]\" Indicates a negative item  -- DELETE ALL ITEMS NOT EXAMINED]  [x] Alert  [x] Oriented to person/place/time    [x] No apparent distress  [] Toxic appearing    [] Face flushed appearing [x] Sclera clear  [] Lips are cyanotic      [x] Breathing appears normal  [] Appears tachypneic      [] Rash on visible skin    [x] Cranial Nerves II-XII grossly intact    [x] Motor grossly intact in visible upper extremities    [x] Motor grossly intact in visible lower extremities    [x] Normal Mood  [] Anxious appearing    [] Depressed appearing  [] Confused appearing      [] Poor short term memory  [] Poor long term memory    [] OTHER: Due to this being a TeleHealth encounter, evaluation of the following organ systems is limited: Vitals/Constitutional/EENT/Resp/CV/GI//MS/Neuro/Skin/Heme-Lymph-Imm. ASSESSMENT/PLAN:   Diagnosis Orders   1. Mild intermittent asthma without complication     2. Allergic dermatitis       Improved. Continue current regimen. F/u in 6 months or sooner PRN. Side effects, adverse effects of the medication prescribed today, as well as treatment plan/ rationale and result expectations have been discussed with the patient who expresses understanding and desires to proceed. Close follow up to evaluate treatment results and for coordination of care. I have reviewed the patient's medical history in detail and updated the computerized patient record. As always, patient is advised that if symptoms worsen in any way they will proceed to the nearest emergency room. Return in about 6 months (around 10/10/2020) for check up. An  electronic signature was used to authenticate this note. --Rachel Johnson, RITA - CNP on 4/10/2020 at 3:36 PM        Pursuant to the emergency declaration under the [facility], 1135 waiver authority and the [employer] and [employer] Act, this Virtual  Visit was conducted, with patient's consent, to reduce the patient's risk of exposure to COVID-19 and provide continuity of care for an established patient. Services were provided through a video synchronous discussion virtually to substitute for in-person clinic visit.

## 2020-10-09 RX ORDER — FLUTICASONE PROPIONATE 50 MCG
1 SPRAY, SUSPENSION (ML) NASAL DAILY
Qty: 1 BOTTLE | Refills: 0 | Status: SHIPPED | OUTPATIENT
Start: 2020-10-09 | End: 2022-07-07 | Stop reason: SDUPTHER

## 2020-11-10 ENCOUNTER — VIRTUAL VISIT (OUTPATIENT)
Dept: FAMILY MEDICINE CLINIC | Age: 30
End: 2020-11-10
Payer: COMMERCIAL

## 2020-11-10 PROCEDURE — 99442 PR PHYS/QHP TELEPHONE EVALUATION 11-20 MIN: CPT | Performed by: NURSE PRACTITIONER

## 2020-11-10 RX ORDER — ALBUTEROL SULFATE 90 UG/1
2 AEROSOL, METERED RESPIRATORY (INHALATION) EVERY 6 HOURS PRN
Qty: 1 INHALER | Refills: 5 | Status: SHIPPED | OUTPATIENT
Start: 2020-11-10 | End: 2021-11-14

## 2020-11-10 RX ORDER — FLUTICASONE PROPIONATE 44 UG/1
2 AEROSOL, METERED RESPIRATORY (INHALATION) 2 TIMES DAILY
Qty: 1 INHALER | Refills: 3 | Status: SHIPPED | OUTPATIENT
Start: 2020-11-10 | End: 2022-07-20 | Stop reason: SDUPTHER

## 2020-11-10 RX ORDER — MELOXICAM 15 MG/1
15 TABLET ORAL DAILY
COMMUNITY
End: 2021-07-29

## 2020-11-10 RX ORDER — METHYLPREDNISOLONE 4 MG/1
TABLET ORAL
Qty: 21 TABLET | Refills: 0 | Status: SHIPPED | OUTPATIENT
Start: 2020-11-10 | End: 2020-11-16

## 2020-11-10 ASSESSMENT — ENCOUNTER SYMPTOMS
ABDOMINAL PAIN: 0
SHORTNESS OF BREATH: 1
CHEST TIGHTNESS: 1
ABDOMINAL DISTENTION: 0
COUGH: 1
APNEA: 1
WHEEZING: 0
BACK PAIN: 0

## 2021-01-21 ENCOUNTER — PATIENT MESSAGE (OUTPATIENT)
Dept: OBGYN CLINIC | Age: 31
End: 2021-01-21

## 2021-01-22 NOTE — TELEPHONE ENCOUNTER
From: Coco Guardado  To: Desiree Wallace MD  Sent: 1/21/2021 7:16 PM EST  Subject: Prescription Augustina Stout hope this message finds you well and happy New Years! I believe I have a severe yeast infection, I know because I have had this before and I have those symptoms that I have had previously. Normally I take a one time medication from my PCP Arnel Randall CNP, she gives me two just in case. This time I have chosen to contact you because I don't know if you want to just prescribe the medication or if you want me to schedule a check-up with you.      Than you,    Lucy Amos

## 2021-01-25 RX ORDER — FLUCONAZOLE 150 MG/1
150 TABLET ORAL
Qty: 1 TABLET | Refills: 1 | Status: SHIPPED | OUTPATIENT
Start: 2021-01-25 | End: 2021-02-08

## 2021-02-08 ENCOUNTER — OFFICE VISIT (OUTPATIENT)
Dept: OBGYN CLINIC | Age: 31
End: 2021-02-08
Payer: COMMERCIAL

## 2021-02-08 VITALS
DIASTOLIC BLOOD PRESSURE: 62 MMHG | WEIGHT: 153 LBS | SYSTOLIC BLOOD PRESSURE: 102 MMHG | HEIGHT: 58 IN | BODY MASS INDEX: 32.12 KG/M2

## 2021-02-08 DIAGNOSIS — Z11.51 SCREENING FOR HUMAN PAPILLOMAVIRUS: ICD-10-CM

## 2021-02-08 DIAGNOSIS — Z01.419 WOMEN'S ANNUAL ROUTINE GYNECOLOGICAL EXAMINATION: ICD-10-CM

## 2021-02-08 DIAGNOSIS — Z11.3 ROUTINE SCREENING FOR STI (SEXUALLY TRANSMITTED INFECTION): ICD-10-CM

## 2021-02-08 DIAGNOSIS — Z01.419 WOMEN'S ANNUAL ROUTINE GYNECOLOGICAL EXAMINATION: Primary | ICD-10-CM

## 2021-02-08 DIAGNOSIS — N89.8 VAGINAL ITCHING: ICD-10-CM

## 2021-02-08 PROCEDURE — 99395 PREV VISIT EST AGE 18-39: CPT | Performed by: OBSTETRICS & GYNECOLOGY

## 2021-02-08 PROCEDURE — 99212 OFFICE O/P EST SF 10 MIN: CPT | Performed by: OBSTETRICS & GYNECOLOGY

## 2021-02-08 PROCEDURE — G8484 FLU IMMUNIZE NO ADMIN: HCPCS | Performed by: OBSTETRICS & GYNECOLOGY

## 2021-02-08 PROCEDURE — 1036F TOBACCO NON-USER: CPT | Performed by: OBSTETRICS & GYNECOLOGY

## 2021-02-08 PROCEDURE — G8427 DOCREV CUR MEDS BY ELIG CLIN: HCPCS | Performed by: OBSTETRICS & GYNECOLOGY

## 2021-02-08 PROCEDURE — G8417 CALC BMI ABV UP PARAM F/U: HCPCS | Performed by: OBSTETRICS & GYNECOLOGY

## 2021-02-08 RX ORDER — FLUCONAZOLE 150 MG/1
150 TABLET ORAL ONCE
Qty: 1 TABLET | Refills: 1 | Status: SHIPPED | OUTPATIENT
Start: 2021-02-08 | End: 2021-02-08

## 2021-02-08 RX ORDER — VITAMIN B COMPLEX
1 CAPSULE ORAL DAILY
COMMUNITY
End: 2021-07-29

## 2021-02-08 ASSESSMENT — ENCOUNTER SYMPTOMS
BLOOD IN STOOL: 0
CONSTIPATION: 0
NAUSEA: 0
ABDOMINAL DISTENTION: 0
VOMITING: 0
DIARRHEA: 0
ALLERGIC/IMMUNOLOGIC NEGATIVE: 1
ABDOMINAL PAIN: 0
RECTAL PAIN: 0
RESPIRATORY NEGATIVE: 1
EYES NEGATIVE: 1
ANAL BLEEDING: 0

## 2021-02-08 ASSESSMENT — VISUAL ACUITY: OU: 1

## 2021-02-08 NOTE — PROGRESS NOTES
hours as needed for Wheezing 1 Inhaler 5    fluticasone (FLOVENT HFA) 44 MCG/ACT inhaler Inhale 2 puffs into the lungs 2 times daily 1 Inhaler 3    fluticasone (FLONASE) 50 MCG/ACT nasal spray 1 spray by Each Nostril route daily 1 Bottle 0     No current facility-administered medications for this visit. Social History     Socioeconomic History    Marital status:      Spouse name: Not on file    Number of children: 2    Years of education: Not on file    Highest education level: Not on file   Occupational History    Occupation: [employer] Financial resource strain: Not hard at all   [employer] insecurity     Worry: Never true     Inability: Never true   [employer] needs     Medical: No     Non-medical: No   Tobacco Use    Smoking status: Never Smoker    Smokeless tobacco: Never Used   Substance and Sexual Activity    Alcohol use:  Yes     Alcohol/week: 4.2 standard drinks     Types: 5 Standard drinks or equivalent per week     Comment: social    Drug use: No    Sexual activity: Yes     Partners: Male   Lifestyle    Physical activity     Days per week: Not on file     Minutes per session: Not on file    Stress: Not on file   Relationships    Social connections     Talks on phone: Not on file     Gets together: Not on file     Attends Gnosticist service: Not on file     Active member of club or organization: Not on file     Attends meetings of clubs or organizations: Not on file     Relationship status: Not on file    Intimate partner violence     Fear of current or ex partner: Not on file     Emotionally abused: Not on file     Physically abused: Not on file     Forced sexual activity: Not on file   Other Topics Concern    Not on file   Social History Narrative    Not on file     Family History   Problem Relation Age of Onset    Arthritis Mother         Rheumatoid Arthritis and fibromyalgia    Arthritis Father     High Blood Pressure Maternal Grandfather     Diabetes Maternal Grandfather     Heart Disease Maternal Grandfather     No Known Problems Paternal Grandfather     No Known Problems Paternal Grandmother     No Known Problems Maternal Grandmother     No Known Problems Brother     No Known Problems Sister     No Known Problems Other     Breast Cancer Neg Hx     Cancer Neg Hx     Colon Cancer Neg Hx     Eclampsia Neg Hx     Hypertension Neg Hx     Ovarian Cancer Neg Hx      Labor Neg Hx     Spont Abortions Neg Hx     Stroke Neg Hx        Review of Systems   Constitutional: Negative. Negative for activity change, appetite change, chills, diaphoresis, fatigue, fever and unexpected weight change. HENT: Negative. Eyes: Negative. Respiratory: Negative. Cardiovascular: Negative. Gastrointestinal: Negative for abdominal distention, abdominal pain, anal bleeding, blood in stool, constipation, diarrhea, nausea, rectal pain and vomiting. Endocrine: Negative. Genitourinary: Positive for vaginal discharge ( itching). Negative for decreased urine volume, difficulty urinating, dyspareunia, dysuria, enuresis, flank pain, frequency, genital sores, hematuria, menstrual problem, pelvic pain, urgency, vaginal bleeding and vaginal pain. Musculoskeletal: Negative. Skin: Negative. Allergic/Immunologic: Negative. Neurological: Negative. Hematological: Negative. Psychiatric/Behavioral: Negative. Objective:     Physical Exam  Constitutional:       Appearance: She is well-developed. HENT:      Head: Normocephalic. Eyes:      General: Lids are normal. Vision grossly intact. Neck:      Musculoskeletal: Normal range of motion and neck supple. Thyroid: No thyromegaly. Cardiovascular:      Rate and Rhythm: Normal rate and regular rhythm. Heart sounds: Normal heart sounds. Pulmonary:      Effort: Pulmonary effort is normal. No respiratory distress. Breath sounds: Normal breath sounds. No wheezing or rales. Chest:      Chest wall: No tenderness. Breasts:         Right: Normal. No swelling, bleeding, inverted nipple, mass, nipple discharge, skin change or tenderness. Left: Normal. No swelling, bleeding, inverted nipple, mass, nipple discharge, skin change or tenderness. Abdominal:      General: There is no distension. Palpations: Abdomen is soft. There is no mass. Tenderness: There is no abdominal tenderness. There is no guarding or rebound. Hernia: No hernia is present. There is no hernia in the left inguinal area or right inguinal area. Genitourinary:     General: Normal vulva. Pubic Area: No rash. Labia:         Right: No rash, tenderness, lesion or injury. Left: No rash, tenderness, lesion or injury. Urethra: No prolapse, urethral swelling or urethral lesion. Vagina: Normal. No signs of injury and foreign body. No vaginal discharge, erythema, tenderness or bleeding. Cervix: Discharge (thicker white discharge) present. No cervical motion tenderness or friability. Uterus: Not deviated, not enlarged, not fixed and not tender. Adnexa:         Right: No mass, tenderness or fullness. Left: No mass, tenderness or fullness. Rectum: Normal.   Musculoskeletal: Normal range of motion. General: No tenderness. Lymphadenopathy:      Cervical: No cervical adenopathy. Upper Body:      Right upper body: No supraclavicular or axillary adenopathy. Left upper body: No supraclavicular or axillary adenopathy. Lower Body: No right inguinal adenopathy. No left inguinal adenopathy. Skin:     General: Skin is warm and dry. Coloration: Skin is not pale. Findings: No erythema or rash. Neurological:      Mental Status: She is alert and oriented to person, place, and time. Psychiatric:         Behavior: Behavior normal.         Thought Content:  Thought content normal.         Judgment: Judgment normal. Assessment:       Diagnosis Orders   1. Women's annual routine gynecological examination  PAP SMEAR   2. Screening for human papillomavirus  PAP SMEAR   3. Vaginal itching  Wet prep, genital    C.trachomatis N.gonorrhoeae DNA   4. Routine screening for STI (sexually transmitted infection)  Wet prep, genital    C.trachomatis N.gonorrhoeae DNA        Plan:      Medications placedthis encounter:  Orders Placed This Encounter   Medications    fluconazole (DIFLUCAN) 150 MG tablet     Sig: Take 1 tablet by mouth once for 1 dose     Dispense:  1 tablet     Refill:  1         Orders placedthis encounter:  Orders Placed This Encounter   Procedures    Wet prep, genital     Standing Status:   Future     Standing Expiration Date:   2022    C.trachomatis N.gonorrhoeae DNA     Standing Status:   Future     Standing Expiration Date:   2022    PAP SMEAR     Patient History:    No LMP recorded. OBGYN Status: Having periods  Past Surgical History:  2014: ABDOMINOPLASTY  2014: BREAST ENHANCEMENT SURGERY  2006:  SECTION  2004:  SECTION  2014: LIPOSUCTION  No date: NOSE SURGERY  No date: TUBAL LIGATION  , 2015: WRIST SURGERY; Right      Comment:  right carpel tunnel release x , Dr. Viki Murrell History    Tobacco Use      Smoking status: Never Smoker      Smokeless tobacco: Never Used       Standing Status:   Future     Standing Expiration Date:   2022     Order Specific Question:   Collection Type     Answer: Thin Prep     Order Specific Question:   Prior Abnormal Pap Test     Answer:   No     Order Specific Question:   Screening or Diagnostic     Answer:   Screening     Order Specific Question:   HPV Requested? Answer:   Yes     Order Specific Question:   High Risk Patient     Answer:   N/A         Follow up:  Return in about 1 year (around 2022) for Annual.   Repeat Annual GYN exam every 1 year. Cervical Cytology exam starts age 24.    If Negative Cytology;  follow-up screening per current guidelines. Mammograms yearly starting at age 36. Calcium and Vitamin D dosing reviewed ( age appropriate ). Colonoscopy and bone density screening discussed ( age appropriate ). Birth control and STD prevention discussed ( age appropriate ). Gardisil counseling completed for all patients 7-35 yo. Routine health maintenance ( per PCP and guidelines ). The patient was asked if she would like a chaperone present for her intimate exam. She  Declined the chaperone.  Tmo Torres

## 2021-02-16 LAB
C TRACH DNA GENITAL QL NAA+PROBE: NEGATIVE
N. GONORRHOEAE DNA: NEGATIVE

## 2021-03-04 ENCOUNTER — PATIENT MESSAGE (OUTPATIENT)
Dept: OBGYN CLINIC | Age: 31
End: 2021-03-04

## 2021-03-04 NOTE — TELEPHONE ENCOUNTER
From: Jung Guardado  To: Claire Vázquez MD  Sent: 3/4/2021 2:07 PM EST  Subject: Non-Urgent Na Crease Dr. Yarely Quinones hope this email finds you well. I wanted to inform you that I had some relief with the one time pill you gave me. Symptoms never went away completely. No bad odor;however, the itch was never completely gone and I feel it's itching more now, the white small little balls are back and the burning sensation. No odor. This is really frustrating now, all my last labs show normal and everything negative for what I understood from my chart. I don't know what to do next.     Sincerely,  Carmine Sparks

## 2021-03-09 ENCOUNTER — OFFICE VISIT (OUTPATIENT)
Dept: OBGYN CLINIC | Age: 31
End: 2021-03-09
Payer: COMMERCIAL

## 2021-03-09 VITALS
BODY MASS INDEX: 32.75 KG/M2 | HEIGHT: 58 IN | SYSTOLIC BLOOD PRESSURE: 100 MMHG | DIASTOLIC BLOOD PRESSURE: 68 MMHG | WEIGHT: 156 LBS

## 2021-03-09 DIAGNOSIS — N89.8 VAGINAL ITCHING: Primary | ICD-10-CM

## 2021-03-09 DIAGNOSIS — N89.8 VAGINAL DISCHARGE: ICD-10-CM

## 2021-03-09 DIAGNOSIS — N89.8 VAGINAL ITCHING: ICD-10-CM

## 2021-03-09 PROCEDURE — G8417 CALC BMI ABV UP PARAM F/U: HCPCS | Performed by: OBSTETRICS & GYNECOLOGY

## 2021-03-09 PROCEDURE — 99212 OFFICE O/P EST SF 10 MIN: CPT | Performed by: OBSTETRICS & GYNECOLOGY

## 2021-03-09 PROCEDURE — G8427 DOCREV CUR MEDS BY ELIG CLIN: HCPCS | Performed by: OBSTETRICS & GYNECOLOGY

## 2021-03-09 PROCEDURE — G8484 FLU IMMUNIZE NO ADMIN: HCPCS | Performed by: OBSTETRICS & GYNECOLOGY

## 2021-03-09 PROCEDURE — 1036F TOBACCO NON-USER: CPT | Performed by: OBSTETRICS & GYNECOLOGY

## 2021-03-09 RX ORDER — FLUCONAZOLE 150 MG/1
150 TABLET ORAL ONCE
Qty: 1 TABLET | Refills: 3 | Status: SHIPPED | OUTPATIENT
Start: 2021-03-09 | End: 2021-03-09

## 2021-03-09 ASSESSMENT — ENCOUNTER SYMPTOMS
ABDOMINAL DISTENTION: 0
NAUSEA: 0
RECTAL PAIN: 0
RESPIRATORY NEGATIVE: 1
ABDOMINAL PAIN: 0
BLOOD IN STOOL: 0
VOMITING: 0
DIARRHEA: 0
ANAL BLEEDING: 0
ALLERGIC/IMMUNOLOGIC NEGATIVE: 1
EYES NEGATIVE: 1
CONSTIPATION: 0

## 2021-03-09 ASSESSMENT — PATIENT HEALTH QUESTIONNAIRE - PHQ9
SUM OF ALL RESPONSES TO PHQ QUESTIONS 1-9: 0
SUM OF ALL RESPONSES TO PHQ QUESTIONS 1-9: 0
2. FEELING DOWN, DEPRESSED OR HOPELESS: 0
SUM OF ALL RESPONSES TO PHQ9 QUESTIONS 1 & 2: 0

## 2021-03-09 NOTE — PROGRESS NOTES
Patient here c/o white clumpy discharge c/w yeast.  + itching and burning x 1 month. States HbA1C still above 6.0 and working with PCP on BS control. Discussed decreasing carbs and sugars in diet. Daily probiotic ( Acidophilus ). Instructed to keep perineal and vaginal area clean and dry. Cotton underwear and loose fitting clothing. No bubble baths or bath bombs. Cx sent. Rx:  Diflucan x 1 with refills. All questions answered. F/U as directed.     Vitals:  /68   Ht 4' 10\" (1.473 m)   Wt 156 lb (70.8 kg)   LMP 2021   BMI 32.60 kg/m²   Past Medical History:   Diagnosis Date    Abnormal ultrasound of breast 2016    Acute bronchitis     Allergic rhinitis     Allergic rhinitis     Amenorrhea     Anemia     Arthralgia     Asthma     Bunionette     CTS (carpal tunnel syndrome) 2014    Cystic acne 2014    Fatigue     Fibromyalgia     Dr. Tish Alvarado High risk medication use     Inflammatory polyarthropathy ([facility] [address]     Dr. Gila Alamo, on plaquenil    Left breast lump     Low libido     Neck pain     Ovarian cyst 2016    right 3.5 cm    Paresthesia of both hands     Prediabetes 2018    Sciatica      Past Surgical History:   Procedure Laterality Date    ABDOMINOPLASTY  2014    BREAST ENHANCEMENT SURGERY  2014     SECTION  2006     SECTION  2004    LIPOSUCTION  2014    NOSE SURGERY      TUBAL LIGATION      WRIST SURGERY Right ,     right carpel tunnel release x , Dr. Elida Wilks     Allergies:  Pcn [penicillins] and Ultram Yani Guerra hcl]  Current Outpatient Medications   Medication Sig Dispense Refill    fluconazole (DIFLUCAN) 150 MG tablet Take 1 tablet by mouth once for 1 dose 1 tablet 3    b complex vitamins capsule Take 1 capsule by mouth daily      meloxicam (MOBIC) 15 MG tablet Take 15 mg by mouth daily      albuterol sulfate  (90 Base) MCG/ACT inhaler Inhale 2 puffs into the lungs every 6 hours as needed for Wheezing 1 Inhaler 5    fluticasone (FLOVENT HFA) 44 MCG/ACT inhaler Inhale 2 puffs into the lungs 2 times daily 1 Inhaler 3    fluticasone (FLONASE) 50 MCG/ACT nasal spray 1 spray by Each Nostril route daily 1 Bottle 0     No current facility-administered medications for this visit. Social History     Socioeconomic History    Marital status:      Spouse name: Not on file    Number of children: 2    Years of education: Not on file    Highest education level: Not on file   Occupational History    Occupation: [employer] Financial resource strain: Not hard at all   [employer] insecurity     Worry: Never true     Inability: Never true   [employer] needs     Medical: No     Non-medical: No   Tobacco Use    Smoking status: Never Smoker    Smokeless tobacco: Never Used   Substance and Sexual Activity    Alcohol use:  Yes     Alcohol/week: 4.2 standard drinks     Types: 5 Standard drinks or equivalent per week     Comment: social    Drug use: No    Sexual activity: Yes     Partners: Male   Lifestyle    Physical activity     Days per week: Not on file     Minutes per session: Not on file    Stress: Not on file   Relationships    Social connections     Talks on phone: Not on file     Gets together: Not on file     Attends [employer] service: Not on file     Active member of club or organization: Not on file     Attends meetings of clubs or organizations: Not on file     Relationship status: Not on file    Intimate partner violence     Fear of current or ex partner: Not on file     Emotionally abused: Not on file     Physically abused: Not on file     Forced sexual activity: Not on file   Other Topics Concern    Not on file   Social History Narrative    Not on file        Family History   Problem Relation Age of Onset    Arthritis Mother         Rheumatoid Arthritis and fibromyalgia    Arthritis Father     High Blood Pressure Maternal Grandfather     Diabetes Maternal Grandfather  Heart Disease Maternal Grandfather     No Known Problems Paternal Grandfather     No Known Problems Paternal Grandmother     No Known Problems Maternal Grandmother     No Known Problems Brother     No Known Problems Sister     No Known Problems Other     Breast Cancer Neg Hx     Cancer Neg Hx     Colon Cancer Neg Hx     Eclampsia Neg Hx     Hypertension Neg Hx     Ovarian Cancer Neg Hx      Labor Neg Hx     Spont Abortions Neg Hx     Stroke Neg Hx        Review of Systems   Constitutional: Negative. Negative for activity change, appetite change, chills, diaphoresis, fatigue, fever and unexpected weight change. HENT: Negative. Eyes: Negative. Respiratory: Negative. Cardiovascular: Negative. Gastrointestinal: Negative for abdominal distention, abdominal pain, anal bleeding, blood in stool, constipation, diarrhea, nausea, rectal pain and vomiting. Endocrine: Negative. Genitourinary: Positive for vaginal discharge. Negative for decreased urine volume, difficulty urinating, dyspareunia, dysuria, enuresis, flank pain, frequency, genital sores, hematuria, menstrual problem, pelvic pain, urgency, vaginal bleeding and vaginal pain. Musculoskeletal: Negative. Skin: Negative. Allergic/Immunologic: Negative. Neurological: Negative. Hematological: Negative. Psychiatric/Behavioral: Negative. Objective:     Physical Exam  Constitutional:       General: She is not in acute distress. Appearance: She is well-developed. She is not diaphoretic. HENT:      Head: Normocephalic and atraumatic. Eyes:      Conjunctiva/sclera: Conjunctivae normal.   Neck:      Musculoskeletal: Normal range of motion and neck supple. Cardiovascular:      Rate and Rhythm: Normal rate and regular rhythm. Pulmonary:      Effort: Pulmonary effort is normal. No respiratory distress. Genitourinary:     Labia:         Right: No rash, tenderness, lesion or injury.          Left: No rash, tenderness, lesion or injury. Vagina: Normal. No signs of injury and foreign body. No vaginal discharge, erythema, tenderness or bleeding. Comments: Clumpy white discharge c/w yeast.    No cervical or vaginal lesions. Normal external genitalia. Musculoskeletal: Normal range of motion. General: No tenderness or deformity. Skin:     General: Skin is warm and dry. Coloration: Skin is not pale. Neurological:      Mental Status: She is alert and oriented to person, place, and time. Motor: No abnormal muscle tone. Coordination: Coordination normal.   Psychiatric:         Behavior: Behavior normal.         Thought Content: Thought content normal.         Judgment: Judgment normal.         Assessment:          Diagnosis Orders   1. Vaginal itching  Wet prep, genital   2. Vaginal discharge  Wet prep, genital        Plan:      Medications placedthis encounter:  Orders Placed This Encounter   Medications    fluconazole (DIFLUCAN) 150 MG tablet     Sig: Take 1 tablet by mouth once for 1 dose     Dispense:  1 tablet     Refill:  3         Orders placedthis encounter:  Orders Placed This Encounter   Procedures    Wet prep, genital     Standing Status:   Future     Standing Expiration Date:   3/9/2022         Follow up:  Return for Annual.              The patient was asked if she would like a chaperone present for her intimate exam. She  Declined the chaperone.  Elie Canales

## 2021-07-29 ENCOUNTER — OFFICE VISIT (OUTPATIENT)
Dept: OBGYN CLINIC | Age: 31
End: 2021-07-29
Payer: COMMERCIAL

## 2021-07-29 VITALS
DIASTOLIC BLOOD PRESSURE: 70 MMHG | WEIGHT: 159 LBS | HEIGHT: 58 IN | SYSTOLIC BLOOD PRESSURE: 112 MMHG | BODY MASS INDEX: 33.37 KG/M2

## 2021-07-29 DIAGNOSIS — N89.8 VAGINAL ITCHING: Primary | ICD-10-CM

## 2021-07-29 DIAGNOSIS — N89.8 VAGINAL DISCHARGE: ICD-10-CM

## 2021-07-29 DIAGNOSIS — N89.8 VAGINAL ITCHING: ICD-10-CM

## 2021-07-29 LAB
CLUE CELLS: NORMAL
HBA1C MFR BLD: 5.7 % (ref 4.8–5.9)
TRICHOMONAS PREP: NORMAL
TRICHOMONAS VAGINALIS SCREEN: NEGATIVE
YEAST WET PREP: NORMAL

## 2021-07-29 PROCEDURE — 99213 OFFICE O/P EST LOW 20 MIN: CPT | Performed by: OBSTETRICS & GYNECOLOGY

## 2021-07-29 PROCEDURE — 1036F TOBACCO NON-USER: CPT | Performed by: OBSTETRICS & GYNECOLOGY

## 2021-07-29 PROCEDURE — 36415 COLL VENOUS BLD VENIPUNCTURE: CPT | Performed by: OBSTETRICS & GYNECOLOGY

## 2021-07-29 PROCEDURE — G8427 DOCREV CUR MEDS BY ELIG CLIN: HCPCS | Performed by: OBSTETRICS & GYNECOLOGY

## 2021-07-29 PROCEDURE — G8417 CALC BMI ABV UP PARAM F/U: HCPCS | Performed by: OBSTETRICS & GYNECOLOGY

## 2021-07-29 RX ORDER — NYSTATIN AND TRIAMCINOLONE ACETONIDE 100000; 1 [USP'U]/G; MG/G
OINTMENT TOPICAL
Qty: 60 G | Refills: 1 | Status: SHIPPED | OUTPATIENT
Start: 2021-07-29

## 2021-07-29 RX ORDER — FLUCONAZOLE 150 MG/1
150 TABLET ORAL DAILY
Qty: 5 TABLET | Refills: 2 | Status: SHIPPED | OUTPATIENT
Start: 2021-07-29 | End: 2021-08-03

## 2021-07-29 ASSESSMENT — ENCOUNTER SYMPTOMS
RECTAL PAIN: 0
RESPIRATORY NEGATIVE: 1
DIARRHEA: 0
NAUSEA: 0
EYES NEGATIVE: 1
VOMITING: 0
CONSTIPATION: 0
ANAL BLEEDING: 0
BLOOD IN STOOL: 0
ABDOMINAL DISTENTION: 0
ALLERGIC/IMMUNOLOGIC NEGATIVE: 1
ABDOMINAL PAIN: 0

## 2021-07-29 NOTE — PROGRESS NOTES
Patient here c/o vaginal and vulvar itching and discharge. States she has profuse itching at night at times and difficult to have SA. Patient has been seen x 2 for same complaint and exam and cultures have been negative. No evidence of atrophic changes or lesions on exam.  SSE with normal appearing discharge today; although patient states she often gets a clumpy \" cottage cheese \" like discharge with sx. Has been on routine Diflucan in past and sx recur. Discussed decreasing carbs and sugars in diet. Daily probiotic ( Acidophilus ). Instructed to keep perineal and vaginal area clean and dry. Cotton underwear and loose fitting clothing. No douching. No bubble baths or bath bombs. Ordered HbA1C and HIV. Rx:  Mycolog Cream prn external sx and will try Diflucan x 5 days. STD screening again performed. All questions answered. F/U as directed.         Vitals:  /70   Ht 4' 10\" (1.473 m)   Wt 159 lb (72.1 kg)   LMP 2021   BMI 33.23 kg/m²   Past Medical History:   Diagnosis Date    Abnormal ultrasound of breast 2016    Acute bronchitis     Allergic rhinitis     Allergic rhinitis     Amenorrhea     Anemia     Arthralgia     Asthma     Bunionette     CTS (carpal tunnel syndrome) 2014    Cystic acne 2014    Fatigue     Fibromyalgia     Dr. Gena Drew High risk medication use     Inflammatory polyarthropathy ([facility] [address]     Dr. Ade Jaramillo, on plaquenil    Left breast lump     Low libido     Neck pain     Ovarian cyst 2016    right 3.5 cm    Paresthesia of both hands     Prediabetes 2018    Sciatica      Past Surgical History:   Procedure Laterality Date    ABDOMINOPLASTY  2014    BREAST ENHANCEMENT SURGERY  2014     SECTION  2006     SECTION  2004    LIPOSUCTION  2014    NOSE SURGERY      TUBAL LIGATION      WRIST SURGERY Right , 2015    right carpel tunnel release x , Dr. Kait Nelson     Allergies:  Pcn [penicillins] and Ultram Tretha Upper Tract hcl]  Current Outpatient Medications   Medication Sig Dispense Refill    nystatin-triamcinolone (MYCOLOG) 869654-3.1 UNIT/GM-% ointment Apply topically 2 times daily. 60 g 1    fluconazole (DIFLUCAN) 150 MG tablet Take 1 tablet by mouth daily for 5 doses 5 tablet 2    albuterol sulfate  (90 Base) MCG/ACT inhaler Inhale 2 puffs into the lungs every 6 hours as needed for Wheezing 1 Inhaler 5    fluticasone (FLOVENT HFA) 44 MCG/ACT inhaler Inhale 2 puffs into the lungs 2 times daily 1 Inhaler 3    fluticasone (FLONASE) 50 MCG/ACT nasal spray 1 spray by Each Nostril route daily 1 Bottle 0     No current facility-administered medications for this visit. Social 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   Tobacco Use    Smoking status: Never Smoker    Smokeless tobacco: Never Used   Substance and Sexual Activity    Alcohol use: Yes     Alcohol/week: 4.2 standard drinks     Types: 5 Standard drinks or equivalent per week     Comment: social    Drug use: No    Sexual activity: Yes     Partners: Male   Other Topics Concern    Not on file   Social History Narrative    Not on file     Social Determinants of Health     Financial Resource Strain:     Difficulty of Paying Living Expenses:    Food Insecurity:     Worried About Running Out of Food in the Last Year:     [address] in the Last Year:    Transportation Needs:     Lack of Transportation (Medical):      Lack of Transportation (Non-Medical):    Physical Activity:     Days of Exercise per Week:     Minutes of Exercise per Session:    Stress:     Feeling of Stress :    Social Connections:     Frequency of Communication with Friends and Family:     Frequency of Social Gatherings with Friends and Family:     Attends Zoroastrianism Services: She is well-developed. She is not diaphoretic. HENT:      Head: Normocephalic and atraumatic. Eyes:      Conjunctiva/sclera: Conjunctivae normal.   Cardiovascular:      Rate and Rhythm: Normal rate and regular rhythm. Pulmonary:      Effort: Pulmonary effort is normal. No respiratory distress. Genitourinary:     Labia:         Right: No rash, tenderness, lesion or injury. Left: No rash, tenderness, lesion or injury. Vagina: Normal. No signs of injury and foreign body. No vaginal discharge, erythema, tenderness or bleeding. Comments: No abnormal discharge. No cervical or vaginal lesions. Normal external genitalia. Musculoskeletal:         General: No tenderness or deformity. Normal range of motion. Cervical back: Normal range of motion and neck supple. Skin:     General: Skin is warm and dry. Coloration: Skin is not pale. Neurological:      Mental Status: She is alert and oriented to person, place, and time. Motor: No abnormal muscle tone. Coordination: Coordination normal.   Psychiatric:         Behavior: Behavior normal.         Thought Content: Thought content normal.         Judgment: Judgment normal.         Assessment:          Diagnosis Orders   1. Vaginal itching  Wet prep, genital    C.trachomatis N.gonorrhoeae DNA    Hemoglobin A1C    HIV Screen   2. Vaginal discharge  Wet prep, genital    C.trachomatis N.gonorrhoeae DNA    Hemoglobin A1C    HIV Screen        Plan:      Medications placedthis encounter:  Orders Placed This Encounter   Medications    nystatin-triamcinolone (MYCOLOG) 458587-9.1 UNIT/GM-% ointment     Sig: Apply topically 2 times daily.      Dispense:  60 g     Refill:  1    fluconazole (DIFLUCAN) 150 MG tablet     Sig: Take 1 tablet by mouth daily for 5 doses     Dispense:  5 tablet     Refill:  2         Orders placedthis encounter:  Orders Placed This Encounter   Procedures    Wet prep, genital     Standing Status:   Future Standing Expiration Date:   7/28/2022    C.trachomatis N.gonorrhoeae DNA     Standing Status:   Future     Standing Expiration Date:   7/28/2022    Hemoglobin A1C     Standing Status:   Future     Standing Expiration Date:   7/29/2022    HIV Screen     Standing Status:   Future     Standing Expiration Date:   7/29/2022         Follow up:  Return for Annual.              The patient was asked if she would like a chaperone present for her intimate exam. She  Declined the chaperone.  Barrie Peres MD

## 2021-07-30 LAB — HIV AG/AB: NONREACTIVE

## 2021-08-04 LAB
C TRACH DNA GENITAL QL NAA+PROBE: NEGATIVE
N. GONORRHOEAE DNA: NEGATIVE

## 2021-11-12 DIAGNOSIS — J45.31 MILD PERSISTENT ASTHMA WITH ACUTE EXACERBATION: ICD-10-CM

## 2021-11-16 RX ORDER — ALBUTEROL SULFATE 90 UG/1
AEROSOL, METERED RESPIRATORY (INHALATION)
Qty: 8.5 G | Refills: 1 | Status: SHIPPED | OUTPATIENT
Start: 2021-11-16 | End: 2022-07-20 | Stop reason: SDUPTHER

## 2021-12-07 ENCOUNTER — TELEPHONE (OUTPATIENT)
Dept: FAMILY MEDICINE CLINIC | Age: 31
End: 2021-12-07

## 2021-12-07 NOTE — TELEPHONE ENCOUNTER
----- Message from Anjali Hicks sent at 12/7/2021  9:06 AM EST -----  Subject: Message to Provider    QUESTIONS  Information for Provider? Pt does apologize for her late minute   cancellation. She currently, is sick with a bug from her daughter  ---------------------------------------------------------------------------  --------------  CALL BACK INFO  What is the best way for the office to contact you? OK to leave message on   voicemail  Preferred Call Back Phone Number? [phone]  ---------------------------------------------------------------------------  --------------  SCRIPT ANSWERS  Relationship to Patient?  Self

## 2022-03-02 ENCOUNTER — OFFICE VISIT (OUTPATIENT)
Dept: OBGYN CLINIC | Age: 32
End: 2022-03-02
Payer: COMMERCIAL

## 2022-03-02 VITALS
DIASTOLIC BLOOD PRESSURE: 60 MMHG | WEIGHT: 150 LBS | BODY MASS INDEX: 31.49 KG/M2 | SYSTOLIC BLOOD PRESSURE: 100 MMHG | HEIGHT: 58 IN

## 2022-03-02 DIAGNOSIS — Z01.419 ENCOUNTER FOR WELL WOMAN EXAM WITH ROUTINE GYNECOLOGICAL EXAM: ICD-10-CM

## 2022-03-02 DIAGNOSIS — Z01.419 ENCOUNTER FOR WELL WOMAN EXAM WITH ROUTINE GYNECOLOGICAL EXAM: Primary | ICD-10-CM

## 2022-03-02 DIAGNOSIS — Z11.51 ENCOUNTER FOR SCREENING FOR HUMAN PAPILLOMAVIRUS (HPV): ICD-10-CM

## 2022-03-02 PROCEDURE — 99395 PREV VISIT EST AGE 18-39: CPT | Performed by: OBSTETRICS & GYNECOLOGY

## 2022-03-02 PROCEDURE — G8484 FLU IMMUNIZE NO ADMIN: HCPCS | Performed by: OBSTETRICS & GYNECOLOGY

## 2022-03-02 ASSESSMENT — ENCOUNTER SYMPTOMS
ABDOMINAL DISTENTION: 0
RECTAL PAIN: 0
VOMITING: 0
ABDOMINAL PAIN: 0
RESPIRATORY NEGATIVE: 1
ANAL BLEEDING: 0
NAUSEA: 0
DIARRHEA: 0
BLOOD IN STOOL: 0
CONSTIPATION: 0
EYES NEGATIVE: 1
ALLERGIC/IMMUNOLOGIC NEGATIVE: 1

## 2022-03-02 ASSESSMENT — VISUAL ACUITY: OU: 1

## 2022-03-02 NOTE — PROGRESS NOTES
The patient was asked if she would like a chaperone present for her intimate exam. She  Declined the chaperone.  Mary Kay Hu MA

## 2022-03-02 NOTE — PROGRESS NOTES
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   Tobacco Use    Smoking status: Never Smoker    Smokeless tobacco: Never Used   Substance and Sexual Activity    Alcohol use: Yes     Alcohol/week: 4.2 standard drinks     Types: 5 Standard drinks or equivalent per week     Comment: social    Drug use: No    Sexual activity: Yes     Partners: Male   Other Topics Concern    Not on file   Social History Narrative    Not on file     Social Determinants of Health     Financial Resource Strain:     Difficulty of Paying Living Expenses: Not on file   Food Insecurity:     Worried About Running Out of Food in the Last Year: Not on file    Kend[place] of Food in the Last Year: Not on file   Transportation Needs:     Lack of Transportation (Medical): Not on file    Lack of Transportation (Non-Medical):  Not on file   Physical Activity:     Days of Exercise per Week: Not on file    Minutes of Exercise per Session: Not on file   Stress:     Feeling of Stress : Not on file   Social Connections:     Frequency of Communication with Friends and Family: Not on file    Frequency of Social Gatherings with Friends and Family: Not on file    Attends Sikh Services: Not on file    Active Member of [address] [place] [address] [employer] or Organizations: Not on file    Attends Club or Organization Meetings: Not on file    Marital Status: Not on file   Intimate Partner Violence:     Fear of Current or Ex-Partner: Not on file    Emotionally Abused: Not on file    Physically Abused: Not on file    Sexually Abused: Not on file   Housing Stability:     Unable to Pay for Housing in the Last Year: Not on file    Number of [place] in the Last Year: Not on file    Unstable Housing in the Last Year: Not on file     Family History   Problem Relation Age of Onset    Arthritis Mother         Rheumatoid Arthritis and fibromyalgia    Arthritis Father     High Blood Pressure Maternal Grandfather     Diabetes Maternal Grandfather     Heart Disease Maternal Grandfather     No Known Problems Paternal Grandfather     No Known Problems Paternal Grandmother     No Known Problems Maternal Grandmother     No Known Problems Brother     No Known Problems Sister     No Known Problems Other     Breast Cancer Neg Hx     Cancer Neg Hx     Colon Cancer Neg Hx     Eclampsia Neg Hx     Hypertension Neg Hx     Ovarian Cancer Neg Hx      Labor Neg Hx     Spont Abortions Neg Hx     Stroke Neg Hx        Review of Systems   Constitutional: Negative. Negative for activity change, appetite change, chills, diaphoresis, fatigue, fever and unexpected weight change. HENT: Negative. Eyes: Negative. Respiratory: Negative. Cardiovascular: Negative. Gastrointestinal: Negative for abdominal distention, abdominal pain, anal bleeding, blood in stool, constipation, diarrhea, nausea, rectal pain and vomiting. Endocrine: Negative. Genitourinary: Negative for decreased urine volume, difficulty urinating, dyspareunia, dysuria, enuresis, flank pain, frequency, genital sores, hematuria, menstrual problem, pelvic pain, urgency, vaginal bleeding, vaginal discharge and vaginal pain. Musculoskeletal: Negative. Skin: Negative. Allergic/Immunologic: Negative. Neurological: Negative. Hematological: Negative. Psychiatric/Behavioral: Negative. Objective:     Physical Exam  Constitutional:       Appearance: She is well-developed. HENT:      Head: Normocephalic. Eyes:      General: Lids are normal. Vision grossly intact. Neck:      Thyroid: No thyromegaly. Cardiovascular:      Rate and Rhythm: Normal rate and regular rhythm. Heart sounds: Normal heart sounds. Pulmonary:      Effort: Pulmonary effort is normal. No respiratory distress. Breath sounds: Normal breath sounds. No wheezing or rales. Chest:      Chest wall: No tenderness. Breasts:      Right: Normal. No swelling, bleeding, inverted nipple, mass, nipple discharge, skin change, tenderness, axillary adenopathy or supraclavicular adenopathy. Left: Normal. No swelling, bleeding, inverted nipple, mass, nipple discharge, skin change, tenderness, axillary adenopathy or supraclavicular adenopathy. Abdominal:      General: There is no distension. Palpations: Abdomen is soft. There is no mass. Tenderness: There is no abdominal tenderness. There is no guarding or rebound. Hernia: No hernia is present. There is no hernia in the left inguinal area or right inguinal area. Genitourinary:     General: Normal vulva. Pubic Area: No rash. Labia:         Right: No rash, tenderness, lesion or injury. Left: No rash, tenderness, lesion or injury. Urethra: No prolapse, urethral swelling or urethral lesion. Vagina: Normal. No signs of injury and foreign body. No vaginal discharge, erythema, tenderness or bleeding. Cervix: No cervical motion tenderness, discharge or friability. Uterus: Not deviated, not enlarged, not fixed and not tender. Adnexa:         Right: No mass, tenderness or fullness. Left: No mass, tenderness or fullness. Rectum: Normal.   Musculoskeletal:         General: No tenderness. Normal range of motion. Cervical back: Normal range of motion and neck supple. Lymphadenopathy:      Cervical: No cervical adenopathy. Upper Body:      Right upper body: No supraclavicular or axillary adenopathy. Left upper body: No supraclavicular or axillary adenopathy. Lower Body: No right inguinal adenopathy. No left inguinal adenopathy. Skin:     General: Skin is warm and dry. Coloration: Skin is not pale. Findings: No erythema or rash.    Neurological:      Mental Status: She is alert and oriented to person, place, and time. Psychiatric:         Behavior: Behavior normal.         Thought Content: Thought content normal.         Judgment: Judgment normal.         Assessment:       Diagnosis Orders   1. Encounter for well woman exam with routine gynecological exam  Pap Smear   2. Encounter for screening for human papillomavirus (HPV)  Pap Smear        Plan:      Medications placedthis encounter:  No orders of the defined types were placed in this encounter. Orders placedthis encounter:  Orders Placed This Encounter   Procedures    Pap Smear     Patient History:    No LMP recorded. OBGYN Status: Having periods  Past Surgical History:  2014: ABDOMINOPLASTY  2014: BREAST ENHANCEMENT SURGERY  2006:  SECTION  2004:  SECTION  2014: LIPOSUCTION  No date: NOSE SURGERY  No date: TUBAL LIGATION  , 2015: WRIST SURGERY; Right      Comment:  right carpel tunnel release x , Dr. Rae Solar History    Tobacco Use      Smoking status: Never Smoker      Smokeless tobacco: Never Used       Standing Status:   Future     Standing Expiration Date:   3/2/2023     Order Specific Question:   Collection Type     Answer: Thin Prep     Order Specific Question:   Prior Abnormal Pap Test     Answer:   No     Order Specific Question:   Screening or Diagnostic     Answer:   Screening     Order Specific Question:   HPV Requested? Answer:   Yes     Order Specific Question:   High Risk Patient     Answer:   N/A         Follow up:  Return in about 1 year (around 3/2/2023) for Annual.   Repeat Annual GYN exam every 1 year. Cervical Cytology exam starts age 24. If Negative Cytology;  follow-up screening per current guidelines. Mammograms yearly starting at age 36. Calcium and Vitamin D dosing reviewed ( age appropriate ). Colonoscopy and bone density screening discussed ( age appropriate ). Birth control and STD prevention discussed ( age appropriate ).     Gardisil counseling completed for all patients ( age appropriate ). Routine health maintenance ( per PCP and guidelines ).

## 2022-03-25 ENCOUNTER — OFFICE VISIT (OUTPATIENT)
Dept: FAMILY MEDICINE CLINIC | Age: 32
End: 2022-03-25
Payer: COMMERCIAL

## 2022-03-25 VITALS
WEIGHT: 156 LBS | HEART RATE: 74 BPM | SYSTOLIC BLOOD PRESSURE: 104 MMHG | DIASTOLIC BLOOD PRESSURE: 66 MMHG | BODY MASS INDEX: 32.75 KG/M2 | OXYGEN SATURATION: 99 % | HEIGHT: 58 IN

## 2022-03-25 DIAGNOSIS — R09.81 CHRONIC NASAL CONGESTION: ICD-10-CM

## 2022-03-25 DIAGNOSIS — M19.90 INFLAMMATORY ARTHRITIS: Primary | ICD-10-CM

## 2022-03-25 DIAGNOSIS — B37.31 VAGINAL YEAST INFECTION: ICD-10-CM

## 2022-03-25 PROCEDURE — 1036F TOBACCO NON-USER: CPT | Performed by: NURSE PRACTITIONER

## 2022-03-25 PROCEDURE — 99214 OFFICE O/P EST MOD 30 MIN: CPT | Performed by: NURSE PRACTITIONER

## 2022-03-25 PROCEDURE — G8417 CALC BMI ABV UP PARAM F/U: HCPCS | Performed by: NURSE PRACTITIONER

## 2022-03-25 PROCEDURE — G8427 DOCREV CUR MEDS BY ELIG CLIN: HCPCS | Performed by: NURSE PRACTITIONER

## 2022-03-25 PROCEDURE — G8484 FLU IMMUNIZE NO ADMIN: HCPCS | Performed by: NURSE PRACTITIONER

## 2022-03-25 RX ORDER — NAPROXEN 500 MG/1
500 TABLET ORAL 2 TIMES DAILY WITH MEALS
Qty: 60 TABLET | Refills: 0 | Status: SHIPPED | OUTPATIENT
Start: 2022-03-25 | End: 2022-06-23 | Stop reason: SDUPTHER

## 2022-03-25 RX ORDER — FLUCONAZOLE 150 MG/1
150 TABLET ORAL
Qty: 3 TABLET | Refills: 0 | Status: SHIPPED | OUTPATIENT
Start: 2022-03-25 | End: 2022-06-23 | Stop reason: ALTCHOICE

## 2022-03-25 SDOH — ECONOMIC STABILITY: FOOD INSECURITY: WITHIN THE PAST 12 MONTHS, YOU WORRIED THAT YOUR FOOD WOULD RUN OUT BEFORE YOU GOT MONEY TO BUY MORE.: NEVER TRUE

## 2022-03-25 SDOH — ECONOMIC STABILITY: FOOD INSECURITY: WITHIN THE PAST 12 MONTHS, THE FOOD YOU BOUGHT JUST DIDN'T LAST AND YOU DIDN'T HAVE MONEY TO GET MORE.: NEVER TRUE

## 2022-03-25 ASSESSMENT — ENCOUNTER SYMPTOMS
EYE PAIN: 0
COLOR CHANGE: 0
DIARRHEA: 0
COUGH: 0
CHEST TIGHTNESS: 0
BACK PAIN: 1
TROUBLE SWALLOWING: 0
ABDOMINAL PAIN: 0
SHORTNESS OF BREATH: 0
CONSTIPATION: 0

## 2022-03-25 ASSESSMENT — PATIENT HEALTH QUESTIONNAIRE - PHQ9
1. LITTLE INTEREST OR PLEASURE IN DOING THINGS: 0
SUM OF ALL RESPONSES TO PHQ QUESTIONS 1-9: 0
SUM OF ALL RESPONSES TO PHQ9 QUESTIONS 1 & 2: 0
2. FEELING DOWN, DEPRESSED OR HOPELESS: 0
SUM OF ALL RESPONSES TO PHQ QUESTIONS 1-9: 0

## 2022-03-25 ASSESSMENT — SOCIAL DETERMINANTS OF HEALTH (SDOH): HOW HARD IS IT FOR YOU TO PAY FOR THE VERY BASICS LIKE FOOD, HOUSING, MEDICAL CARE, AND HEATING?: NOT HARD AT ALL

## 2022-03-25 NOTE — PROGRESS NOTES
Subjective  Chief Complaint   Patient presents with    Numbness     states that she has been habing pain and numbness in her hands and feet and upper back.  Nausea     states that she feels nausea and dizzy when she gets the pain in her upper back. HPI    Pain in upper back/neck area, sensitive to touch, pain so severe will cause nausea, when she sleeps arms will go numb. Numbness will go all the way down the down the arms to fingertips, mainly occurs at night. During the day will feel like \"ants\" crawling down her arms. Legs will go numb after sitting for while. Pt does have h/o inflammatory arthritis which presented exactly like these symptoms. She had been treated with different medications but feels she did not tolerate them well. Is trying to treat naturally with different herbs, eats healthy, drinks a lot of water, avoids sugar. Had yeast infection 1 year ago, took diflucan several times and yeast infection kept coming back, now had pap in early March which did show candida, pt is having vaginal itching and will have burning/irritation with sexual intercourse. Seasonal allergies-feels chronic nasal congestion, using flonase twice daily which provides a little relief, but not complete. Siblings have same issue and brother is going to have surgery.      Past Medical History:   Diagnosis Date    Abnormal ultrasound of breast 12/2016    Acute bronchitis     Allergic rhinitis     Allergic rhinitis     Amenorrhea     Anemia     Arthralgia     Asthma     Bunionette     CTS (carpal tunnel syndrome) 8/28/2014    Cystic acne 09/04/2014    Fatigue     Fibromyalgia     Dr. Eladio Contreras High risk medication use     Inflammatory polyarthropathy ([facility] [address]     Dr. Melanie Felix, on plaquenil    Left breast lump     Low libido     Neck pain     Ovarian cyst 09/2016    right 3.5 cm    Paresthesia of both hands     Prediabetes 02/2018    Sciatica      Patient Active Problem List Diagnosis Date Noted    Prediabetes 2018    Amenorrhea     Low libido     High risk medication use     Abnormal ultrasound of breast 2016    Left breast lump     Fibromyalgia     Inflammatory polyarthropathy (HCC)     Ovarian cyst 2016    Paresthesia of both hands     Allergic rhinitis     Asthma     Cystic acne 2014     Past Surgical History:   Procedure Laterality Date    ABDOMINOPLASTY  2014    BREAST ENHANCEMENT SURGERY  2014     SECTION  2006     SECTION  2004    LIPOSUCTION  2014    NOSE SURGERY      TUBAL LIGATION      WRIST SURGERY Right ,     right carpel tunnel release x , Dr. Alon Santana     Family History   Problem Relation Age of Onset    Arthritis Mother         Rheumatoid Arthritis and fibromyalgia    Arthritis Father     High Blood Pressure Maternal Grandfather     Diabetes Maternal Grandfather     Heart Disease Maternal Grandfather     No Known Problems Paternal Grandfather     No Known Problems Paternal Grandmother     No Known Problems Maternal Grandmother     No Known Problems Brother     No Known Problems Sister     No Known Problems Other     Breast Cancer Neg Hx     Cancer Neg Hx     Colon Cancer Neg Hx     Eclampsia Neg Hx     Hypertension Neg Hx     Ovarian Cancer Neg Hx      Labor Neg Hx     Spont Abortions Neg Hx     Stroke Neg Hx      Social History     Socioeconomic History    Marital status:      Spouse name: None    Number of children: 2    Years of education: None    Highest education level: None   Occupational History    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   Tobacco Use    Smoking status: Never Smoker    Smokeless tobacco: Never Used   Vaping Use    Vaping Use: Never used   Substance and Sexual Activity    Alcohol use:  Yes     Alcohol/week: 4.2 standard drinks     Types: 5 Standard drinks or equivalent per week     Comment: social    Drug use: No    Sexual activity: Yes Partners: Male   Other Topics Concern    None   Social History Narrative    None     Social Determinants of Health     Financial Resource Strain: Low Risk     Difficulty of Paying Living Expenses: Not hard at all   Food Insecurity: No Food Insecurity    Worried About Running Out of Food in the Last Year: Never true    Kendrick of Food in the Last Year: Never true   Transportation Needs:     Lack of Transportation (Medical): Not on file    Lack of Transportation (Non-Medical): Not on file   Physical Activity:     Days of Exercise per Week: Not on file    Minutes of Exercise per Session: Not on file   Stress:     Feeling of Stress : Not on file   Social Connections:     Frequency of Communication with Friends and Family: Not on file    Frequency of Social Gatherings with Friends and Family: Not on file    Attends Confucianism Services: Not on file    Active Member of [address] [place] [address] [employer] or Organizations: Not on file    Attends Club or Organization Meetings: Not on file    Marital Status: Not on file   Intimate Partner Violence:     Fear of Current or Ex-Partner: Not on file    Emotionally Abused: Not on file    Physically Abused: Not on file    Sexually Abused: Not on file   Housing Stability:     Unable to Pay for Housing in the Last Year: Not on file    Number of [place] in the Last Year: Not on file    Unstable Housing in the Last Year: Not on file     Current Outpatient Medications on File Prior to Visit   Medication Sig Dispense Refill    albuterol sulfate  (90 Base) MCG/ACT inhaler inhale 2 puffs by mouth and INTO THE LUNGS every 6 hours if needed for wheezing 8.5 g 1    nystatin-triamcinolone (MYCOLOG) 778660-7.1 UNIT/GM-% ointment Apply topically 2 times daily.  60 g 1    fluticasone (FLOVENT HFA) 44 MCG/ACT inhaler Inhale 2 puffs into the lungs 2 times daily 1 Inhaler 3    fluticasone (FLONASE) 50 MCG/ACT nasal spray 1 spray by Each Nostril route daily 1 Bottle 0     No current reactive to light. Cardiovascular:      Rate and Rhythm: Normal rate and regular rhythm. Pulses: Normal pulses. Heart sounds: Normal heart sounds. No murmur heard. Pulmonary:      Effort: Pulmonary effort is normal. No respiratory distress. Breath sounds: Normal breath sounds. No stridor. No wheezing, rhonchi or rales. Chest:      Chest wall: No tenderness. Musculoskeletal:         General: Normal range of motion. Cervical back: Normal range of motion and neck supple. No tenderness. Right lower leg: No edema. Left lower leg: No edema. Lymphadenopathy:      Cervical: No cervical adenopathy. Skin:     General: Skin is warm. Capillary Refill: Capillary refill takes less than 2 seconds. Coloration: Skin is not jaundiced. Findings: No erythema or lesion. Neurological:      General: No focal deficit present. Mental Status: She is alert and oriented to person, place, and time. Mental status is at baseline. Cranial Nerves: No cranial nerve deficit. Coordination: Coordination normal.      Gait: Gait normal.   Psychiatric:         Mood and Affect: Mood normal.         Behavior: Behavior normal.         Thought Content: Thought content normal.         Judgment: Judgment normal.         Assessment& Plan     Diagnosis Orders   1. Inflammatory arthritis  Amb External Referral To Rheumatology    naproxen (NAPROSYN) 500 MG tablet   2. Vaginal yeast infection  fluconazole (DIFLUCAN) 150 MG tablet   3. Chronic nasal congestion  MEENU - Fredis Haile MD, Otolaryngology, [facility]     Referral to rheumatology as ordered. Naprosyn BID for 1 week then PRN. Diflucan every 3 days for 9 days. F/u if no improvement. Referral to ENT for chronic nasal congestion.   Side effects, adverse effects of the medication prescribed today, as well as treatment plan/ rationale and result expectations have been discussed with the patient who expresses understanding and desires to proceed. Close follow up to evaluate treatment results and for coordination of care. I have reviewed the patient's medical history in detail and updated the computerized patient record. As always, patient is advised that if symptoms worsen in any way they will proceed to the nearest emergency room. Orders Placed This Encounter   Procedures    Amb External Referral To Rheumatology     Referral Priority:   Routine     Referral Type:   Eval and Treat     Referral Reason:   Specialty Services Required     Referred to Provider:   Darcy Ahuja MD     Requested Specialty:   Rheumatology     Number of Visits Requested:   1    MEENU - Carrie George MD, Otolaryngology, [place]     Referral Priority:   Routine     Referral Type:   Eval and Treat     Referral Reason:   Specialty Services Required     Referred to Provider:   Apolinar Quintero MD     Requested Specialty:   Otolaryngology     Number of Visits Requested:   1       Orders Placed This Encounter   Medications    naproxen (NAPROSYN) 500 MG tablet     Sig: Take 1 tablet by mouth 2 times daily (with meals)     Dispense:  60 tablet     Refill:  0    fluconazole (DIFLUCAN) 150 MG tablet     Sig: Take 1 tablet by mouth every 72 hours     Dispense:  3 tablet     Refill:  0       There are no discontinued medications. Return if symptoms worsen or fail to improve.     Fernanda Layton, APRN - CNP

## 2022-06-23 ENCOUNTER — OFFICE VISIT (OUTPATIENT)
Dept: FAMILY MEDICINE CLINIC | Age: 32
End: 2022-06-23
Payer: COMMERCIAL

## 2022-06-23 VITALS
HEIGHT: 58 IN | WEIGHT: 168 LBS | BODY MASS INDEX: 35.26 KG/M2 | SYSTOLIC BLOOD PRESSURE: 112 MMHG | DIASTOLIC BLOOD PRESSURE: 82 MMHG | OXYGEN SATURATION: 100 % | HEART RATE: 63 BPM

## 2022-06-23 DIAGNOSIS — M19.90 INFLAMMATORY ARTHRITIS: ICD-10-CM

## 2022-06-23 DIAGNOSIS — R73.03 PREDIABETES: ICD-10-CM

## 2022-06-23 DIAGNOSIS — R51.9 NOCTURNAL HEADACHES: ICD-10-CM

## 2022-06-23 DIAGNOSIS — R39.198 DIFFICULTY URINATING: ICD-10-CM

## 2022-06-23 DIAGNOSIS — M79.7 FIBROMYALGIA: Primary | ICD-10-CM

## 2022-06-23 DIAGNOSIS — R63.5 WEIGHT GAIN: ICD-10-CM

## 2022-06-23 LAB
ALBUMIN SERPL-MCNC: 4.4 G/DL (ref 3.5–4.6)
ALP BLD-CCNC: 41 U/L (ref 40–130)
ALT SERPL-CCNC: 13 U/L (ref 0–33)
ANION GAP SERPL CALCULATED.3IONS-SCNC: 11 MEQ/L (ref 9–15)
AST SERPL-CCNC: 23 U/L (ref 0–35)
BILIRUB SERPL-MCNC: 0.3 MG/DL (ref 0.2–0.7)
BILIRUBIN URINE: NEGATIVE
BLOOD, URINE: NEGATIVE
BUN BLDV-MCNC: 13 MG/DL (ref 6–20)
CALCIUM SERPL-MCNC: 9.3 MG/DL (ref 8.5–9.9)
CHLORIDE BLD-SCNC: 99 MEQ/L (ref 95–107)
CLARITY: CLEAR
CO2: 24 MEQ/L (ref 20–31)
COLOR: YELLOW
CREAT SERPL-MCNC: 0.66 MG/DL (ref 0.5–0.9)
GFR AFRICAN AMERICAN: >60
GFR NON-AFRICAN AMERICAN: >60
GLOBULIN: 3 G/DL (ref 2.3–3.5)
GLUCOSE BLD-MCNC: 102 MG/DL (ref 70–99)
GLUCOSE URINE: NEGATIVE MG/DL
HBA1C MFR BLD: 6.1 % (ref 4.8–5.9)
HCT VFR BLD CALC: 37.1 % (ref 37–47)
HEMOGLOBIN: 12.1 G/DL (ref 12–16)
KETONES, URINE: NEGATIVE MG/DL
LEUKOCYTE ESTERASE, URINE: NEGATIVE
MCH RBC QN AUTO: 26.7 PG (ref 27–31.3)
MCHC RBC AUTO-ENTMCNC: 32.7 % (ref 33–37)
MCV RBC AUTO: 81.6 FL (ref 82–100)
NITRITE, URINE: NEGATIVE
PDW BLD-RTO: 15.1 % (ref 11.5–14.5)
PH UA: 7 (ref 5–9)
PLATELET # BLD: 282 K/UL (ref 130–400)
POTASSIUM SERPL-SCNC: 4.5 MEQ/L (ref 3.4–4.9)
PROTEIN UA: NEGATIVE MG/DL
RBC # BLD: 4.55 M/UL (ref 4.2–5.4)
SODIUM BLD-SCNC: 134 MEQ/L (ref 135–144)
SPECIFIC GRAVITY UA: 1.01 (ref 1–1.03)
TOTAL PROTEIN: 7.4 G/DL (ref 6.3–8)
TSH REFLEX: 0.99 UIU/ML (ref 0.44–3.86)
UROBILINOGEN, URINE: 0.2 E.U./DL
WBC # BLD: 5.9 K/UL (ref 4.8–10.8)

## 2022-06-23 PROCEDURE — G8427 DOCREV CUR MEDS BY ELIG CLIN: HCPCS | Performed by: NURSE PRACTITIONER

## 2022-06-23 PROCEDURE — 99214 OFFICE O/P EST MOD 30 MIN: CPT | Performed by: NURSE PRACTITIONER

## 2022-06-23 PROCEDURE — G8417 CALC BMI ABV UP PARAM F/U: HCPCS | Performed by: NURSE PRACTITIONER

## 2022-06-23 PROCEDURE — 1036F TOBACCO NON-USER: CPT | Performed by: NURSE PRACTITIONER

## 2022-06-23 RX ORDER — TIZANIDINE 2 MG/1
2 TABLET ORAL NIGHTLY PRN
Qty: 30 TABLET | Refills: 0 | Status: SHIPPED | OUTPATIENT
Start: 2022-06-23 | End: 2022-07-20 | Stop reason: SDUPTHER

## 2022-06-23 RX ORDER — NAPROXEN 500 MG/1
500 TABLET ORAL 2 TIMES DAILY WITH MEALS
Qty: 60 TABLET | Refills: 0 | Status: SHIPPED | OUTPATIENT
Start: 2022-06-23 | End: 2022-07-20 | Stop reason: SDUPTHER

## 2022-06-23 ASSESSMENT — ENCOUNTER SYMPTOMS
COLOR CHANGE: 0
CONSTIPATION: 0
EYE PAIN: 0
CHEST TIGHTNESS: 0
TROUBLE SWALLOWING: 0
BACK PAIN: 0
SHORTNESS OF BREATH: 0
ABDOMINAL PAIN: 0
COUGH: 0
DIARRHEA: 0

## 2022-06-23 NOTE — PROGRESS NOTES
Subjective  Chief Complaint   Patient presents with    Headache     states that she has been getting headaches at night when she is sleeping for about 2 months now. states that she has not been taking anything for them. HPI     Headaches-occurring nightly, wakes up in the middle of the night with headache. Present when she wakes up in the morning, has to shower and then pain will start to subside. Pain is located all around head, \"Feels like a band around my head\". Denies snoring. Not able to urinate first thing in the morning. Gaining weight, increased appetite. Was referred to rheum d/t inflammatory arthritis, not able to get in until next year.     Past Medical History:   Diagnosis Date    Abnormal ultrasound of breast 2016    Acute bronchitis     Allergic rhinitis     Allergic rhinitis     Amenorrhea     Anemia     Arthralgia     Asthma     Bunionette     CTS (carpal tunnel syndrome) 2014    Cystic acne 2014    Fatigue     Fibromyalgia     Dr. Artem Lopez High risk medication use     Inflammatory polyarthropathy ([address]     Dr. Luis A Kurtz, on plaquenil    Left breast lump     Low libido     Neck pain     Ovarian cyst 2016    right 3.5 cm    Paresthesia of both hands     Prediabetes 2018    Sciatica      Patient Active Problem List    Diagnosis Date Noted    Prediabetes 2018    Amenorrhea     Low libido     High risk medication use     Abnormal ultrasound of breast 2016    Left breast lump     Fibromyalgia     Inflammatory polyarthropathy ([address]     Ovarian cyst 2016    Paresthesia of both hands     Allergic rhinitis     Asthma     Cystic acne 2014     Past Surgical History:   Procedure Laterality Date    ABDOMINOPLASTY  2014    BREAST ENHANCEMENT SURGERY  2014     SECTION  2006     SECTION  2004    LIPOSUCTION  2014    NOSE SURGERY      TUBAL LIGATION      WRIST SURGERY Right , 2015    right carpel tunnel release x , Dr. Elaine Hopkins     Family History   Problem Relation Age of Onset    Arthritis Mother         Rheumatoid Arthritis and fibromyalgia    Arthritis Father     High Blood Pressure Maternal Grandfather     Diabetes Maternal Grandfather     Heart Disease Maternal Grandfather     No Known Problems Paternal Grandfather     No Known Problems Paternal Grandmother     No Known Problems Maternal Grandmother     No Known Problems Brother     No Known Problems Sister     No Known Problems Other     Breast Cancer Neg Hx     Cancer Neg Hx     Colon Cancer Neg Hx     Eclampsia Neg Hx     Hypertension Neg Hx     Ovarian Cancer Neg Hx      Labor Neg Hx     Spont Abortions Neg Hx     Stroke Neg Hx      Social History     Socioeconomic History    Marital status:      Spouse name: None    Number of children: 2    Years of education: None    Highest education level: None   Occupational History    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   Tobacco Use    Smoking status: Never Smoker    Smokeless tobacco: Never Used   Vaping Use    Vaping Use: Never used   Substance and Sexual Activity    Alcohol use: Yes     Alcohol/week: 4.2 standard drinks     Types: 5 Standard drinks or equivalent per week     Comment: social    Drug use: No    Sexual activity: Yes     Partners: Male   Other Topics Concern    None   Social History Narrative    None     Social Determinants of Health     Financial Resource Strain: Low Risk     Difficulty of Paying Living Expenses: Not hard at all   Food Insecurity: No Food Insecurity    Worried About Running Out of Food in the Last Year: Never true    [place] of Food in the Last Year: Never true   Transportation Needs:     Lack of Transportation (Medical): Not on file    Lack of Transportation (Non-Medical):  Not on file   Physical Activity:     Days of Exercise per Week: Not on file    Minutes of Exercise per Session: Not on file   Stress:     Feeling of Stress : Not on file   Social Connections:     Frequency of Communication with Friends and Family: Not on file    Frequency of Social Gatherings with Friends and Family: Not on file    Attends Mu-ism Services: Not on file    Active Member of Clubs or Organizations: Not on file    Attends Club or Organization Meetings: Not on file    Marital Status: Not on file   Intimate Partner Violence:     Fear of Current or Ex-Partner: Not on file    Emotionally Abused: Not on file    Physically Abused: Not on file    Sexually Abused: Not on file   Housing Stability:     Unable to Pay for Housing in the Last Year: Not on file    Number of [place] in the Last Year: Not on file    Unstable Housing in the Last Year: Not on file     Current Outpatient Medications on File Prior to Visit   Medication Sig Dispense Refill    albuterol sulfate  (90 Base) MCG/ACT inhaler inhale 2 puffs by mouth and INTO THE LUNGS every 6 hours if needed for wheezing 8.5 g 1    nystatin-triamcinolone (MYCOLOG) 680452-9.1 UNIT/GM-% ointment Apply topically 2 times daily. 60 g 1    fluticasone (FLOVENT HFA) 44 MCG/ACT inhaler Inhale 2 puffs into the lungs 2 times daily 1 Inhaler 3    fluticasone (FLONASE) 50 MCG/ACT nasal spray 1 spray by Each Nostril route daily 1 Bottle 0     No current facility-administered medications on file prior to visit. Allergies   Allergen Reactions    Pcn [Penicillins] Hives, Shortness Of Breath, Anaphylaxis, Itching and Rash    Ultram [Tramadol Hcl] Other (See Comments)     Made her feel funny in her hand       Review of Systems   Constitutional: Positive for unexpected weight change. Negative for activity change, appetite change, chills, diaphoresis, fatigue and fever. HENT: Negative for congestion, ear pain, hearing loss and trouble swallowing. Eyes: Negative for pain and visual disturbance. Respiratory: Negative for cough, chest tightness and shortness of breath.     Cardiovascular: Negative for chest pain, palpitations and leg swelling. Gastrointestinal: Negative for abdominal pain, constipation and diarrhea. Endocrine: Negative for polydipsia, polyphagia and polyuria. Genitourinary: Positive for difficulty urinating. Negative for dysuria. Musculoskeletal: Positive for arthralgias. Negative for back pain. Skin: Negative for color change and rash. Neurological: Positive for headaches. Negative for dizziness and light-headedness. Psychiatric/Behavioral: Negative for dysphoric mood. The patient is not nervous/anxious. Objective  Vitals:    06/23/22 0902   BP: 112/82   Site: Left Upper Arm   Position: Sitting   Cuff Size: Medium Adult   Pulse: 63   SpO2: 100%   Weight: 168 lb (76.2 kg)   Height: 4' 10\" (1.473 m)     Physical Exam  Vitals reviewed. Constitutional:       General: She is not in acute distress. Appearance: Normal appearance. She is well-developed. She is obese. She is not ill-appearing, toxic-appearing or diaphoretic. HENT:      Head: Normocephalic and atraumatic. Right Ear: Hearing, tympanic membrane, ear canal and external ear normal. There is no impacted cerumen. Left Ear: Hearing, tympanic membrane, ear canal and external ear normal. There is no impacted cerumen. Nose: Nose normal. No congestion or rhinorrhea. Mouth/Throat:      Mouth: Mucous membranes are moist.      Pharynx: Oropharynx is clear. No oropharyngeal exudate or posterior oropharyngeal erythema. Eyes:      General:         Right eye: No discharge. Left eye: No discharge. Conjunctiva/sclera: Conjunctivae normal.      Pupils: Pupils are equal, round, and reactive to light. Neck:      Thyroid: No thyromegaly. Cardiovascular:      Rate and Rhythm: Normal rate and regular rhythm. Pulses: Normal pulses. Heart sounds: Normal heart sounds. No murmur heard. Pulmonary:      Effort: Pulmonary effort is normal. No respiratory distress.       Breath sounds: Normal breath sounds. No stridor. No wheezing, rhonchi or rales. Chest:      Chest wall: No tenderness. Musculoskeletal:         General: Normal range of motion. Cervical back: Neck supple. No tenderness. Right lower leg: No edema. Left lower leg: No edema. Lymphadenopathy:      Cervical: No cervical adenopathy. Skin:     General: Skin is warm and dry. Capillary Refill: Capillary refill takes less than 2 seconds. Coloration: Skin is not jaundiced or pale. Findings: No bruising, erythema, lesion or rash. Neurological:      General: No focal deficit present. Mental Status: She is alert and oriented to person, place, and time. Mental status is at baseline. Cranial Nerves: No cranial nerve deficit. Coordination: Coordination normal.      Gait: Gait normal.   Psychiatric:         Mood and Affect: Mood normal.         Speech: Speech normal.         Behavior: Behavior normal.         Thought Content: Thought content normal.         Judgment: Judgment normal.         Assessment& Plan     Diagnosis Orders   1. Fibromyalgia     2. Inflammatory arthritis  naproxen (NAPROSYN) 500 MG tablet    Amb External Referral To Rheumatology    Vitamin D 25 Hydroxy   3. Prediabetes  Hemoglobin A1C   4. Nocturnal headaches  tiZANidine (ZANAFLEX) 2 MG tablet    Comprehensive Metabolic Panel    CBC    Vitamin B12 & Folate   5. Weight gain  TSH with Reflex   6. Difficulty urinating  Urinalysis     Check labs as ordered. Zanaflex for tightness in neck/possible tension headachce. Referral to rheum. F/u in 2 weeks or sooner PRN. Side effects, adverse effects of the medication prescribed today, as well as treatment plan/ rationale and result expectations have been discussed with the patient who expresses understanding and desires to proceed. Close follow up to evaluate treatment results and for coordination of care.   I have reviewed the patient's medical history in detail and updated the computerized patient record. As always, patient is advised that if symptoms worsen in any way they will proceed to the nearest emergency room.        Orders Placed This Encounter   Procedures    Hemoglobin A1C     Standing Status:   Future     Number of Occurrences:   1     Standing Expiration Date:   6/23/2023    Comprehensive Metabolic Panel     Standing Status:   Future     Number of Occurrences:   1     Standing Expiration Date:   6/23/2023    TSH with Reflex     Standing Status:   Future     Number of Occurrences:   1     Standing Expiration Date:   6/23/2023    CBC     Standing Status:   Future     Number of Occurrences:   1     Standing Expiration Date:   6/23/2023    Vitamin D 25 Hydroxy     Standing Status:   Future     Number of Occurrences:   1     Standing Expiration Date:   6/23/2023    Vitamin B12 & Folate     Standing Status:   Future     Number of Occurrences:   1     Standing Expiration Date:   6/23/2023    Urinalysis     Standing Status:   Future     Number of Occurrences:   1     Standing Expiration Date:   6/23/2023    Amb External Referral To Rheumatology     Referral Priority:   Routine     Referral Type:   Eval and Treat     Referral Reason:   Specialty Services Required     Referred to Provider:   Easton Moser MD     Requested Specialty:   Rheumatology     Number of Visits Requested:   1       Orders Placed This Encounter   Medications    naproxen (NAPROSYN) 500 MG tablet     Sig: Take 1 tablet by mouth 2 times daily (with meals)     Dispense:  60 tablet     Refill:  0    tiZANidine (ZANAFLEX) 2 MG tablet     Sig: Take 1 tablet by mouth nightly as needed (tension headache)     Dispense:  30 tablet     Refill:  0       Medications Discontinued During This Encounter   Medication Reason    fluconazole (DIFLUCAN) 150 MG tablet Therapy completed    naproxen (NAPROSYN) 500 MG tablet REORDER       Return in about 2 weeks (around 7/7/2022) for headaches, weight gain, 30 min.    Sameer Aranda, APRN - CNP

## 2022-06-24 LAB
FOLATE: 16.1 NG/ML
VITAMIN B-12: 573 PG/ML (ref 232–1245)
VITAMIN D 25-HYDROXY: 26.5 NG/ML

## 2022-07-07 ENCOUNTER — OFFICE VISIT (OUTPATIENT)
Dept: FAMILY MEDICINE CLINIC | Age: 32
End: 2022-07-07
Payer: COMMERCIAL

## 2022-07-07 VITALS
SYSTOLIC BLOOD PRESSURE: 98 MMHG | OXYGEN SATURATION: 98 % | HEART RATE: 60 BPM | DIASTOLIC BLOOD PRESSURE: 60 MMHG | HEIGHT: 57 IN | WEIGHT: 166 LBS | BODY MASS INDEX: 35.81 KG/M2

## 2022-07-07 DIAGNOSIS — R73.03 PREDIABETES: Primary | ICD-10-CM

## 2022-07-07 DIAGNOSIS — J30.9 ALLERGIC RHINITIS, UNSPECIFIED SEASONALITY, UNSPECIFIED TRIGGER: ICD-10-CM

## 2022-07-07 DIAGNOSIS — E66.01 CLASS 2 SEVERE OBESITY DUE TO EXCESS CALORIES WITH SERIOUS COMORBIDITY AND BODY MASS INDEX (BMI) OF 35.0 TO 35.9 IN ADULT (HCC): ICD-10-CM

## 2022-07-07 PROCEDURE — 99213 OFFICE O/P EST LOW 20 MIN: CPT | Performed by: NURSE PRACTITIONER

## 2022-07-07 PROCEDURE — G8427 DOCREV CUR MEDS BY ELIG CLIN: HCPCS | Performed by: NURSE PRACTITIONER

## 2022-07-07 PROCEDURE — 1036F TOBACCO NON-USER: CPT | Performed by: NURSE PRACTITIONER

## 2022-07-07 PROCEDURE — G8417 CALC BMI ABV UP PARAM F/U: HCPCS | Performed by: NURSE PRACTITIONER

## 2022-07-07 RX ORDER — FLUTICASONE PROPIONATE 50 MCG
1 SPRAY, SUSPENSION (ML) NASAL DAILY
Qty: 1 EACH | Refills: 0 | Status: SHIPPED | OUTPATIENT
Start: 2022-07-07

## 2022-07-07 RX ORDER — PHENTERMINE HYDROCHLORIDE 37.5 MG/1
37.5 TABLET ORAL
Qty: 30 TABLET | Refills: 0 | Status: SHIPPED | OUTPATIENT
Start: 2022-07-07 | End: 2022-08-05 | Stop reason: SDUPTHER

## 2022-07-07 ASSESSMENT — ENCOUNTER SYMPTOMS
CHEST TIGHTNESS: 0
ABDOMINAL PAIN: 0
EYE PAIN: 0
SHORTNESS OF BREATH: 0
TROUBLE SWALLOWING: 0
COLOR CHANGE: 0
DIARRHEA: 0
CONSTIPATION: 0
COUGH: 0
BACK PAIN: 0

## 2022-07-07 NOTE — PROGRESS NOTES
Subjective  Chief Complaint   Patient presents with    Headache     states that the headaches have been controlled on new medication    Weight Gain       HPI    F/u on headache and weight gain. Started on naprosyn and zanaflex at last visit for possible tension headache. Had labs completed which were significant for Vitamin d def and worsening prediabetes with A1C of 6.1. Pt has actually lost 2 pounds since last visit. Has been trying to decrease her caloric intake, but has been struggling with increase snacking.      Past Medical History:   Diagnosis Date    Abnormal ultrasound of breast 2016    Acute bronchitis     Allergic rhinitis     Allergic rhinitis     Amenorrhea     Anemia     Arthralgia     Asthma     Bunionette     CTS (carpal tunnel syndrome) 2014    Cystic acne 2014    Fatigue     Fibromyalgia     Dr. Windy Irving High risk medication use     Inflammatory polyarthropathy ([address]     Dr. Alf Bynum, on plaquenil    Left breast lump     Low libido     Neck pain     Ovarian cyst 2016    right 3.5 cm    Paresthesia of both hands     Prediabetes 2018    Sciatica      Patient Active Problem List    Diagnosis Date Noted    Prediabetes 2018    Amenorrhea     Low libido     High risk medication use     Abnormal ultrasound of breast 2016    Left breast lump     Fibromyalgia     Inflammatory polyarthropathy (HCC)     Ovarian cyst 2016    Paresthesia of both hands     Allergic rhinitis     Asthma     Cystic acne 2014     Past Surgical History:   Procedure Laterality Date    ABDOMINOPLASTY  2014    BREAST ENHANCEMENT SURGERY  2014     SECTION  2006     SECTION  2004    LIPOSUCTION  2014    NOSE SURGERY      TUBAL LIGATION      WRIST SURGERY Right ,     right carpel tunnel release x , Dr. Cole iNcole     Family History   Problem Relation Age of Onset    Arthritis Mother         Rheumatoid Arthritis and fibromyalgia    Arthritis Father     High Blood Pressure Maternal Grandfather     Diabetes Maternal Grandfather     Heart Disease Maternal Grandfather     No Known Problems Paternal Grandfather     No Known Problems Paternal Grandmother     No Known Problems Maternal Grandmother     No Known Problems Brother     No Known Problems Sister     No Known Problems Other     Breast Cancer Neg Hx     Cancer Neg Hx     Colon Cancer Neg Hx     Eclampsia Neg Hx     Hypertension Neg Hx     Ovarian Cancer Neg Hx      Labor Neg Hx     Spont Abortions Neg Hx     Stroke Neg Hx      Social 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   Tobacco Use    Smoking status: Never Smoker    Smokeless tobacco: Never Used   Vaping Use    Vaping Use: Never used   Substance and Sexual Activity    Alcohol use: Yes     Alcohol/week: 4.2 standard drinks     Types: 5 Standard drinks or equivalent per week     Comment: social    Drug use: No    Sexual activity: Yes     Partners: Male   Other Topics Concern    Not on file   Social History Narrative    Not on file     Social Determinants of Health     Financial Resource Strain: Low Risk     Difficulty of Paying Living Expenses: Not hard at all   Food Insecurity: No Food Insecurity    Worried About [address] [facility] in the Last Year: Never true    [facility] in the Last Year: Never true   Transportation Needs:     Lack of Transportation (Medical): Not on file    Lack of Transportation (Non-Medical):  Not on file   Physical Activity:     Days of Exercise per Week: Not on file    Minutes of Exercise per Session: Not on file   Stress:     Feeling of Stress : Not on file   Social Connections:     Frequency of Communication with Friends and Family: Not on file  Frequency of Social Gatherings with Friends and Family: Not on file    Attends Anabaptism Services: Not on file    Active Member of Clubs or Organizations: Not on file    Attends Club or Organization Meetings: Not on file    Marital Status: Not on file   Intimate Partner Violence:     Fear of Current or Ex-Partner: Not on file    Emotionally Abused: Not on file    Physically Abused: Not on file    Sexually Abused: Not on file   Housing Stability:     Unable to Pay for Housing in the Last Year: Not on file    Number of [place] in the Last Year: Not on file    Unstable Housing in the Last Year: Not on file     Current Outpatient Medications on File Prior to Visit   Medication Sig Dispense Refill    naproxen (NAPROSYN) 500 MG tablet Take 1 tablet by mouth 2 times daily (with meals) 60 tablet 0    tiZANidine (ZANAFLEX) 2 MG tablet Take 1 tablet by mouth nightly as needed (tension headache) 30 tablet 0    albuterol sulfate  (90 Base) MCG/ACT inhaler inhale 2 puffs by mouth and INTO THE LUNGS every 6 hours if needed for wheezing 8.5 g 1    nystatin-triamcinolone (MYCOLOG) 834428-9.1 UNIT/GM-% ointment Apply topically 2 times daily. 60 g 1    fluticasone (FLOVENT HFA) 44 MCG/ACT inhaler Inhale 2 puffs into the lungs 2 times daily 1 Inhaler 3     No current facility-administered medications on file prior to visit. Allergies   Allergen Reactions    Pcn [Penicillins] Hives, Shortness Of Breath, Anaphylaxis, Itching and Rash    Ultram [Tramadol Hcl] Other (See Comments)     Made her feel funny in her hand       Review of Systems   Constitutional: Positive for appetite change and unexpected weight change. Negative for activity change, chills, diaphoresis, fatigue and fever. HENT: Negative for congestion, ear pain, hearing loss and trouble swallowing. Eyes: Negative for pain and visual disturbance. Respiratory: Negative for cough, chest tightness and shortness of breath. Cardiovascular: Negative for chest pain, palpitations and leg swelling. Gastrointestinal: Negative for abdominal pain, constipation and diarrhea. Endocrine: Negative for polydipsia, polyphagia and polyuria. Genitourinary: Negative for difficulty urinating and dysuria. Musculoskeletal: Negative for arthralgias and back pain. Skin: Negative for color change and rash. Neurological: Negative for dizziness and light-headedness. Psychiatric/Behavioral: Negative for dysphoric mood. The patient is not nervous/anxious. Objective  Vitals:    07/07/22 1148   BP: 98/60   Site: Left Upper Arm   Position: Sitting   Cuff Size: Medium Adult   Pulse: 60   SpO2: 98%   Weight: 166 lb (75.3 kg)   Height: 4' 9\" (1.448 m)     Physical Exam  Constitutional:       General: She is not in acute distress. Appearance: Normal appearance. She is obese. She is not ill-appearing, toxic-appearing or diaphoretic. HENT:      Head: Normocephalic and atraumatic. Right Ear: External ear normal.      Left Ear: External ear normal.      Nose: Nose normal. No congestion or rhinorrhea. Eyes:      Extraocular Movements: Extraocular movements intact. Conjunctiva/sclera: Conjunctivae normal.      Pupils: Pupils are equal, round, and reactive to light. Cardiovascular:      Rate and Rhythm: Normal rate and regular rhythm. Pulses: Normal pulses. Heart sounds: Normal heart sounds. No murmur heard. Pulmonary:      Effort: Pulmonary effort is normal. No respiratory distress. Breath sounds: Normal breath sounds. No stridor. No wheezing, rhonchi or rales. Chest:      Chest wall: No tenderness. Musculoskeletal:         General: Normal range of motion. Cervical back: Normal range of motion and neck supple. No tenderness. Right lower leg: No edema. Left lower leg: No edema. Lymphadenopathy:      Cervical: No cervical adenopathy. Skin:     General: Skin is warm.       Capillary Refill: Capillary refill takes less than 2 seconds. Coloration: Skin is not jaundiced. Findings: No erythema or lesion. Neurological:      General: No focal deficit present. Mental Status: She is alert and oriented to person, place, and time. Mental status is at baseline. Cranial Nerves: No cranial nerve deficit. Coordination: Coordination normal.      Gait: Gait normal.   Psychiatric:         Mood and Affect: Mood normal.         Behavior: Behavior normal.         Thought Content: Thought content normal.         Judgment: Judgment normal.         Assessment& Plan     Diagnosis Orders   1. Prediabetes  phentermine (ADIPEX-P) 37.5 MG tablet   2. Allergic rhinitis, unspecified seasonality, unspecified trigger  fluticasone (FLONASE) 50 MCG/ACT nasal spray   3. Class 2 severe obesity due to excess calories with serious comorbidity and body mass index (BMI) of 35.0 to 35.9 in adult (HCC)  phentermine (ADIPEX-P) 37.5 MG tablet     My fitness pal and map my walk recommended. Rules for adipex given to pt to be seen every month for 3 months, to fill rx each month every 30-37 days once started. And informed insurance will likely not cover the medication. Pt verbalized an understanding. Time spent face to face was 15 minutes with more than 50% of my time spent counseling. F/u in 4 weeks. Side effects, adverse effects of the medication prescribed today, as well as treatment plan/ rationale and result expectations have been discussed with the patient who expresses understanding and desires to proceed. Close follow up to evaluate treatment results and for coordination of care. I have reviewed the patient's medical history in detail and updated the computerized patient record. As always, patient is advised that if symptoms worsen in any way they will proceed to the nearest emergency room. No orders of the defined types were placed in this encounter.       Orders Placed This Encounter

## 2022-07-20 DIAGNOSIS — R51.9 NOCTURNAL HEADACHES: ICD-10-CM

## 2022-07-20 DIAGNOSIS — M19.90 INFLAMMATORY ARTHRITIS: ICD-10-CM

## 2022-07-20 DIAGNOSIS — J45.31 MILD PERSISTENT ASTHMA WITH ACUTE EXACERBATION: ICD-10-CM

## 2022-07-21 RX ORDER — TIZANIDINE 2 MG/1
2 TABLET ORAL NIGHTLY PRN
Qty: 30 TABLET | Refills: 0 | Status: SHIPPED | OUTPATIENT
Start: 2022-07-21

## 2022-07-21 RX ORDER — ALBUTEROL SULFATE 90 UG/1
2 AEROSOL, METERED RESPIRATORY (INHALATION) EVERY 4 HOURS PRN
Qty: 8.5 G | Refills: 1 | Status: SHIPPED | OUTPATIENT
Start: 2022-07-21 | End: 2022-10-06

## 2022-07-21 RX ORDER — NAPROXEN 500 MG/1
500 TABLET ORAL 2 TIMES DAILY WITH MEALS
Qty: 60 TABLET | Refills: 0 | Status: SHIPPED | OUTPATIENT
Start: 2022-07-21

## 2022-07-21 RX ORDER — FLUTICASONE PROPIONATE 44 UG/1
2 AEROSOL, METERED RESPIRATORY (INHALATION) 2 TIMES DAILY
Qty: 1 EACH | Refills: 3 | Status: SHIPPED | OUTPATIENT
Start: 2022-07-21

## 2022-07-21 NOTE — TELEPHONE ENCOUNTER
Future Appointments    Encounter Information    Provider Department Appt Notes   8/5/2022 Daniella Bhatia, APRN - [facility] Return in about 4 weeks (around 8/4/2022) for weight/adipex.    3/2/2023 MD JASS Gonzales [facility] OB[facility]Gyn annual       Past Visits    Date Provider Specialty Visit Type Primary Dx   07/07/2022 Daniella Bhatia, APRN - CNP Family Medicine Office Visit Prediabetes

## 2022-08-05 ENCOUNTER — OFFICE VISIT (OUTPATIENT)
Dept: FAMILY MEDICINE CLINIC | Age: 32
End: 2022-08-05
Payer: COMMERCIAL

## 2022-08-05 VITALS
HEART RATE: 60 BPM | DIASTOLIC BLOOD PRESSURE: 82 MMHG | OXYGEN SATURATION: 93 % | HEIGHT: 57 IN | BODY MASS INDEX: 33.87 KG/M2 | WEIGHT: 157 LBS | SYSTOLIC BLOOD PRESSURE: 120 MMHG

## 2022-08-05 DIAGNOSIS — E66.9 CLASS 1 OBESITY WITH SERIOUS COMORBIDITY AND BODY MASS INDEX (BMI) OF 33.0 TO 33.9 IN ADULT, UNSPECIFIED OBESITY TYPE: ICD-10-CM

## 2022-08-05 DIAGNOSIS — G89.29 CHRONIC MIDLINE THORACIC BACK PAIN: Primary | ICD-10-CM

## 2022-08-05 DIAGNOSIS — M54.2 CERVICAL PAIN (NECK): ICD-10-CM

## 2022-08-05 DIAGNOSIS — R73.03 PREDIABETES: ICD-10-CM

## 2022-08-05 DIAGNOSIS — M54.6 CHRONIC MIDLINE THORACIC BACK PAIN: Primary | ICD-10-CM

## 2022-08-05 PROCEDURE — G8417 CALC BMI ABV UP PARAM F/U: HCPCS | Performed by: NURSE PRACTITIONER

## 2022-08-05 PROCEDURE — G8427 DOCREV CUR MEDS BY ELIG CLIN: HCPCS | Performed by: NURSE PRACTITIONER

## 2022-08-05 PROCEDURE — 99214 OFFICE O/P EST MOD 30 MIN: CPT | Performed by: NURSE PRACTITIONER

## 2022-08-05 PROCEDURE — 1036F TOBACCO NON-USER: CPT | Performed by: NURSE PRACTITIONER

## 2022-08-05 RX ORDER — PHENTERMINE HYDROCHLORIDE 37.5 MG/1
37.5 TABLET ORAL
Qty: 30 TABLET | Refills: 0 | Status: SHIPPED | OUTPATIENT
Start: 2022-08-05 | End: 2022-09-08 | Stop reason: SDUPTHER

## 2022-08-05 ASSESSMENT — ENCOUNTER SYMPTOMS
BACK PAIN: 1
ABDOMINAL PAIN: 0
CHEST TIGHTNESS: 0
TROUBLE SWALLOWING: 0
COUGH: 0
EYE PAIN: 0
CONSTIPATION: 0
SHORTNESS OF BREATH: 0
DIARRHEA: 0
COLOR CHANGE: 0

## 2022-08-05 NOTE — PROGRESS NOTES
Subjective  Chief Complaint   Patient presents with    Weight Loss     4 week F/U       HPI    F/u back pain and neck pain, headaches and weight. Upper back pain, neck pain, leading to headaches in the back of the head. Will notice that when pain in back and neck get bad she will then notice swelling in bilateral upper ext as well. Pain in back/neck when she moves her head a certain way. Taking adipex daily, no side effects, has lost 9 pounds since last visit. Has noticed decreased appetite, staying hydrated with water.      Past Medical History:   Diagnosis Date    Abnormal ultrasound of breast 2016    Acute bronchitis     Allergic rhinitis     Allergic rhinitis     Amenorrhea     Anemia     Arthralgia     Asthma     Bunionette     CTS (carpal tunnel syndrome) 2014    Cystic acne 2014    Fatigue     Fibromyalgia     Dr. Ashley Blancas    High risk medication use     Inflammatory polyarthropathy ([address]     Dr. Ashley Blancas, on plaquenil    Left breast lump     Low libido     Neck pain     Ovarian cyst 2016    right 3.5 cm    Paresthesia of both hands     Prediabetes 2018    Sciatica      Patient Active Problem List    Diagnosis Date Noted    Prediabetes 2018    Amenorrhea     Low libido     High risk medication use     Abnormal ultrasound of breast 2016    Left breast lump     Fibromyalgia     Inflammatory polyarthropathy (HCC)     Ovarian cyst 2016    Paresthesia of both hands     Allergic rhinitis     Asthma     Cystic acne 2014     Past Surgical History:   Procedure Laterality Date    ABDOMINOPLASTY  2014    BREAST ENHANCEMENT SURGERY  2014     SECTION  2006     SECTION  2004    LIPOSUCTION  2014    NOSE SURGERY      TUBAL LIGATION      WRIST SURGERY Right , 2015    right carpel tunnel release x , Dr. Vini Schneider     Family History   Problem Relation Age of Onset    Arthritis Mother         Rheumatoid Arthritis and fibromyalgia    Arthritis Father     High Blood Pressure Maternal Grandfather     Diabetes Maternal Grandfather     Heart Disease Maternal Grandfather     No Known Problems Paternal Grandfather     No Known Problems Paternal Grandmother     No Known Problems Maternal Grandmother     No Known Problems Brother     No Known Problems Sister     No Known Problems Other     Breast Cancer Neg Hx     Cancer Neg Hx     Colon Cancer Neg Hx     Eclampsia Neg Hx     Hypertension Neg Hx     Ovarian Cancer Neg Hx      Labor Neg Hx     Spont Abortions Neg Hx     Stroke Neg Hx      Social History     Socioeconomic History    Marital status:      Spouse name: None    Number of children: 2    Years of education: None    Highest education level: None   Occupational History    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   Tobacco Use    Smoking status: Never    Smokeless tobacco: Never   Vaping Use    Vaping Use: Never used   Substance and Sexual Activity    Alcohol use: Yes     Alcohol/week: 4.2 standard drinks     Types: 5 Standard drinks or equivalent per week     Comment: social    Drug use: No    Sexual activity: Yes     Partners: Male     Social Determinants of Health     Financial Resource Strain: Low Risk     Difficulty of Paying Living Expenses: Not hard at all   Food Insecurity: No Food Insecurity    Worried About Running Out of Food in the Last Year: Never true    Ran Out of Food in the Last Year: Never true     Current Outpatient Medications on File Prior to Visit   Medication Sig Dispense Refill    fluticasone (FLOVENT HFA) 44 MCG/ACT inhaler Inhale 2 puffs into the lungs in the morning and 2 puffs before bedtime. 1 each 3    albuterol sulfate HFA (PROVENTIL;VENTOLIN;PROAIR) 108 (90 Base) MCG/ACT inhaler Inhale 2 puffs into the lungs every 4 hours as needed for Wheezing 8.5 g 1    naproxen (NAPROSYN) 500 MG tablet Take 1 tablet by mouth in the morning and 1 tablet in the evening. Take with meals.  60 tablet 0    tiZANidine (ZANAFLEX) 2 MG tablet Take 1 tablet by mouth nightly as needed (tension headache) 30 tablet 0    fluticasone (FLONASE) 50 MCG/ACT nasal spray 1 spray by Each Nostril route daily 1 each 0    nystatin-triamcinolone (MYCOLOG) 304799-5.1 UNIT/GM-% ointment Apply topically 2 times daily. 60 g 1     No current facility-administered medications on file prior to visit. Allergies   Allergen Reactions    Pcn [Penicillins] Hives, Shortness Of Breath, Anaphylaxis, Itching and Rash    Ultram [Tramadol Hcl] Other (See Comments)     Made her feel funny in her hand       Review of Systems   Constitutional:  Negative for activity change, appetite change, chills, diaphoresis, fatigue and fever. HENT:  Negative for congestion, ear pain, hearing loss and trouble swallowing. Eyes:  Negative for pain and visual disturbance. Respiratory:  Negative for cough, chest tightness and shortness of breath. Cardiovascular:  Negative for chest pain, palpitations and leg swelling. Gastrointestinal:  Negative for abdominal pain, constipation and diarrhea. Endocrine: Negative for polydipsia, polyphagia and polyuria. Genitourinary:  Negative for difficulty urinating and dysuria. Musculoskeletal:  Positive for arthralgias and back pain. Skin:  Negative for color change and rash. Neurological:  Positive for headaches. Negative for dizziness and light-headedness. Psychiatric/Behavioral:  Negative for dysphoric mood. The patient is not nervous/anxious. Objective  Vitals:    08/05/22 1003   BP: 120/82   Site: Left Upper Arm   Position: Sitting   Cuff Size: Medium Adult   Pulse: 60   SpO2: 93%   Weight: 157 lb (71.2 kg)   Height: 4' 9\" (1.448 m)     Physical Exam  Constitutional:       General: She is not in acute distress. Appearance: Normal appearance. She is obese. She is not ill-appearing, toxic-appearing or diaphoretic. HENT:      Head: Normocephalic and atraumatic.       Right Ear: External ear normal.      Left Ear: External ear normal.      Nose: Nose normal. No congestion or rhinorrhea. Eyes:      Extraocular Movements: Extraocular movements intact. Conjunctiva/sclera: Conjunctivae normal.      Pupils: Pupils are equal, round, and reactive to light. Cardiovascular:      Rate and Rhythm: Normal rate and regular rhythm. Pulses: Normal pulses. Heart sounds: Normal heart sounds. No murmur heard. Pulmonary:      Effort: Pulmonary effort is normal. No respiratory distress. Breath sounds: Normal breath sounds. No stridor. No wheezing, rhonchi or rales. Chest:      Chest wall: No tenderness. Musculoskeletal:         General: Normal range of motion. Cervical back: Normal range of motion and neck supple. No tenderness. Right lower leg: No edema. Left lower leg: No edema. Lymphadenopathy:      Cervical: No cervical adenopathy. Skin:     General: Skin is warm. Capillary Refill: Capillary refill takes less than 2 seconds. Coloration: Skin is not jaundiced. Findings: No erythema or lesion. Neurological:      General: No focal deficit present. Mental Status: She is alert and oriented to person, place, and time. Mental status is at baseline. Cranial Nerves: No cranial nerve deficit. Coordination: Coordination normal.      Gait: Gait normal.   Psychiatric:         Mood and Affect: Mood normal.         Behavior: Behavior normal.         Thought Content: Thought content normal.         Judgment: Judgment normal.       Assessment& Plan     Diagnosis Orders   1. Chronic midline thoracic back pain  XR THORACIC SPINE (3 VIEWS)    [facility] - [place]      2. Prediabetes  phentermine (ADIPEX-P) 37.5 MG tablet      3. Cervical pain (neck)  XR CERVICAL SPINE (4-5 VIEWS)    [facility] - [place]      4.  Class 1 obesity with serious comorbidity and body mass index (BMI) of 33.0 to 33.9 in adult, unspecified obesity type  phentermine (ADIPEX-P) 37.5 MG tablet        Xray of cervical and thoracic spine. Referral to PT. Continue adipex for one additional month. F/u in 4 weeks or sooner PRN. Side effects, adverse effects of the medication prescribed today, as well as treatment plan/ rationale and result expectations have been discussed with the patient who expresses understanding and desires to proceed. Close follow up to evaluate treatment results and for coordination of care. I have reviewed the patient's medical history in detail and updated the computerized patient record. As always, patient is advised that if symptoms worsen in any way they will proceed to the nearest emergency room. Orders Placed This Encounter   Procedures    XR THORACIC SPINE (3 VIEWS)     Standing Status:   Future     Number of Occurrences:   1     Standing Expiration Date:   8/5/2023     Order Specific Question:   Reason for exam:     Answer:   thoracic back pain    XR CERVICAL SPINE (4-5 VIEWS)     Standing Status:   Future     Number of Occurrences:   1     Standing Expiration Date:   8/5/2023     Order Specific Question:   Reason for exam:     Answer:   neck pain    [facility] [place]     Referral Priority:   Routine     Referral Type:   Eval and Treat     Referral Reason:   Specialty Services Required     Requested Specialty:   Physical Therapist     Number of Visits Requested:   1       Orders Placed This Encounter   Medications    phentermine (ADIPEX-P) 37.5 MG tablet     Sig: Take 1 tablet by mouth every morning (before breakfast) for 30 days. Dispense:  30 tablet     Refill:  0     BMI 33.97       Medications Discontinued During This Encounter   Medication Reason    phentermine (ADIPEX-P) 37.5 MG tablet REORDER       Return in about 4 weeks (around 9/2/2022) for back pain, weight.     Red Bustos, APRN - CNP

## 2022-09-08 ENCOUNTER — OFFICE VISIT (OUTPATIENT)
Dept: FAMILY MEDICINE CLINIC | Age: 32
End: 2022-09-08
Payer: COMMERCIAL

## 2022-09-08 VITALS
WEIGHT: 156 LBS | BODY MASS INDEX: 33.66 KG/M2 | DIASTOLIC BLOOD PRESSURE: 81 MMHG | HEART RATE: 73 BPM | OXYGEN SATURATION: 100 % | SYSTOLIC BLOOD PRESSURE: 126 MMHG | HEIGHT: 57 IN

## 2022-09-08 DIAGNOSIS — R73.03 PREDIABETES: ICD-10-CM

## 2022-09-08 DIAGNOSIS — E66.9 CLASS 1 OBESITY WITH SERIOUS COMORBIDITY AND BODY MASS INDEX (BMI) OF 33.0 TO 33.9 IN ADULT, UNSPECIFIED OBESITY TYPE: ICD-10-CM

## 2022-09-08 DIAGNOSIS — M19.90 INFLAMMATORY ARTHRITIS: Primary | ICD-10-CM

## 2022-09-08 PROCEDURE — G8417 CALC BMI ABV UP PARAM F/U: HCPCS | Performed by: NURSE PRACTITIONER

## 2022-09-08 PROCEDURE — 99214 OFFICE O/P EST MOD 30 MIN: CPT | Performed by: NURSE PRACTITIONER

## 2022-09-08 PROCEDURE — G8427 DOCREV CUR MEDS BY ELIG CLIN: HCPCS | Performed by: NURSE PRACTITIONER

## 2022-09-08 PROCEDURE — 1036F TOBACCO NON-USER: CPT | Performed by: NURSE PRACTITIONER

## 2022-09-08 RX ORDER — METHYLPREDNISOLONE 4 MG/1
TABLET ORAL
Qty: 21 TABLET | Refills: 0 | Status: SHIPPED | OUTPATIENT
Start: 2022-09-08 | End: 2022-09-14

## 2022-09-08 RX ORDER — PHENTERMINE HYDROCHLORIDE 37.5 MG/1
37.5 TABLET ORAL
Qty: 30 TABLET | Refills: 0 | Status: SHIPPED | OUTPATIENT
Start: 2022-09-08 | End: 2022-10-08

## 2022-09-08 ASSESSMENT — ENCOUNTER SYMPTOMS
TROUBLE SWALLOWING: 0
CHEST TIGHTNESS: 0
ABDOMINAL PAIN: 0
DIARRHEA: 0
COUGH: 0
CONSTIPATION: 0
BACK PAIN: 0
EYE PAIN: 0
COLOR CHANGE: 0
SHORTNESS OF BREATH: 0

## 2022-09-08 NOTE — PROGRESS NOTES
Subjective  Chief Complaint   Patient presents with    Weight Management     4 week f/u    Back Pain     Pt states she has had back pain for a long time but now her arms/hands are swelling and starting to bruise which is new. HPI    Back/neck pain-had xrays at last visit which were negative. Was referred to PT; however did not schedule. Forgot to call back to schedule. Also tried to schedule with rheumatology but was told soonest appt was next year. Having swelling and bruising in bilateral forearms now that started last week. Has h/o inflammatory polyarthritis and this does feel similar to that, but worse than previous episodes. Also having fatigue. F/u on adipex, completed second month. Lost additional 1 pound since last visit for total of 10 pounds over 2 months.      Past Medical History:   Diagnosis Date    Abnormal ultrasound of breast 2016    Acute bronchitis     Allergic rhinitis     Allergic rhinitis     Amenorrhea     Anemia     Arthralgia     Asthma     Bunionette     CTS (carpal tunnel syndrome) 2014    Cystic acne 2014    Fatigue     Fibromyalgia     Dr. Leon    High risk medication use     Inflammatory polyarthropathy ([facility] [address]     Dr. Leon, on plaquenil    Left breast lump     Low libido     Neck pain     Ovarian cyst 2016    right 3.5 cm    Paresthesia of both hands     Prediabetes 2018    Sciatica      Patient Active Problem List    Diagnosis Date Noted    Prediabetes 2018    Amenorrhea     Low libido     High risk medication use     Abnormal ultrasound of breast 2016    Left breast lump     Fibromyalgia     Inflammatory polyarthropathy (HCC)     Ovarian cyst 2016    Paresthesia of both hands     Allergic rhinitis     Asthma     Cystic acne 2014     Past Surgical History:   Procedure Laterality Date    ABDOMINOPLASTY  2014    BREAST ENHANCEMENT SURGERY  2014     SECTION  2006     SECTION  2004    LIPOSUCTION  2014    NOSE SURGERY      TUBAL LIGATION      WRIST SURGERY Right ,     right carpel tunnel release x , Dr. Rupert Hudson     Family History   Problem Relation Age of Onset    Arthritis Mother         Rheumatoid Arthritis and fibromyalgia    Arthritis Father     High Blood Pressure Maternal Grandfather     Diabetes Maternal Grandfather     Heart Disease Maternal Grandfather     No Known Problems Paternal Grandfather     No Known Problems Paternal Grandmother     No Known Problems Maternal Grandmother     No Known Problems Brother     No Known Problems Sister     No Known Problems Other     Breast Cancer Neg Hx     Cancer Neg Hx     Colon Cancer Neg Hx     Eclampsia Neg Hx     Hypertension Neg Hx     Ovarian Cancer Neg Hx      Labor Neg Hx     Spont Abortions Neg Hx     Stroke Neg Hx      Social History     Socioeconomic History    Marital status:      Spouse name: None    Number of children: 2    Years of education: None    Highest education level: None   Occupational History    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   Tobacco Use    Smoking status: Never    Smokeless tobacco: Never   Vaping Use    Vaping Use: Never used   Substance and Sexual Activity    Alcohol use: Yes     Alcohol/week: 4.2 standard drinks     Types: 5 Standard drinks or equivalent per week     Comment: social    Drug use: No    Sexual activity: Yes     Partners: Male     Social Determinants of Health     Financial Resource Strain: Low Risk     Difficulty of Paying Living Expenses: Not hard at all   Food Insecurity: No Food Insecurity    Worried About [facility] in the Last Year: Never true    [facility] in the Last Year: Never true     Current Outpatient Medications on File Prior to Visit   Medication Sig Dispense Refill    Cholecalciferol (VITAMIN D3) 125 MCG (5000 UT) TABS Take by mouth daily      fluticasone (FLOVENT HFA) 44 MCG/ACT inhaler Inhale 2 puffs into the lungs in the morning and 2 puffs before bedtime.  1 each 3    albuterol sulfate HFA (PROVENTIL;VENTOLIN;PROAIR) 108 (90 Base) MCG/ACT inhaler Inhale 2 puffs into the lungs every 4 hours as needed for Wheezing 8.5 g 1    naproxen (NAPROSYN) 500 MG tablet Take 1 tablet by mouth in the morning and 1 tablet in the evening. Take with meals. 60 tablet 0    tiZANidine (ZANAFLEX) 2 MG tablet Take 1 tablet by mouth nightly as needed (tension headache) 30 tablet 0    fluticasone (FLONASE) 50 MCG/ACT nasal spray 1 spray by Each Nostril route daily 1 each 0    nystatin-triamcinolone (MYCOLOG) 292540-9.1 UNIT/GM-% ointment Apply topically 2 times daily. 60 g 1     No current facility-administered medications on file prior to visit. Allergies   Allergen Reactions    Pcn [Penicillins] Hives, Shortness Of Breath, Anaphylaxis, Itching and Rash    Ultram [Tramadol Hcl] Other (See Comments)     Made her feel funny in her hand       Review of Systems   Constitutional:  Negative for activity change, appetite change, chills, diaphoresis, fatigue and fever. HENT:  Negative for congestion, ear pain, hearing loss and trouble swallowing. Eyes:  Negative for pain and visual disturbance. Respiratory:  Negative for cough, chest tightness and shortness of breath. Cardiovascular:  Negative for chest pain, palpitations and leg swelling. Gastrointestinal:  Negative for abdominal pain, constipation and diarrhea. Endocrine: Negative for polydipsia, polyphagia and polyuria. Genitourinary:  Negative for difficulty urinating and dysuria. Musculoskeletal:  Positive for arthralgias and myalgias. Negative for back pain. Skin:  Negative for color change and rash. Neurological:  Negative for dizziness and light-headedness. Psychiatric/Behavioral:  Negative for dysphoric mood. The patient is not nervous/anxious.       Objective  Vitals:    09/08/22 1055   BP: 126/81   Site: Left Upper Arm   Position: Sitting   Cuff Size: Medium Adult   Pulse: 73   SpO2: 100%   Weight: 156 lb (70.8 kg)   Height: 4' 9\" (1.448 m)     Physical Exam  Constitutional:       General: She is not in acute distress. Appearance: Normal appearance. She is obese. She is not ill-appearing, toxic-appearing or diaphoretic. HENT:      Head: Normocephalic and atraumatic. Right Ear: External ear normal.      Left Ear: External ear normal.      Nose: Nose normal. No congestion or rhinorrhea. Eyes:      Extraocular Movements: Extraocular movements intact. Conjunctiva/sclera: Conjunctivae normal.      Pupils: Pupils are equal, round, and reactive to light. Cardiovascular:      Rate and Rhythm: Normal rate and regular rhythm. Pulses: Normal pulses. Heart sounds: Normal heart sounds. No murmur heard. Pulmonary:      Effort: Pulmonary effort is normal. No respiratory distress. Breath sounds: Normal breath sounds. No stridor. No wheezing, rhonchi or rales. Chest:      Chest wall: No tenderness. Musculoskeletal:         General: Normal range of motion. Cervical back: Normal range of motion and neck supple. No tenderness. Right lower leg: No edema. Left lower leg: No edema. Comments: Bilateral wrist/forearm edema, very mild ecchymosis right dorsal forearm   Lymphadenopathy:      Cervical: No cervical adenopathy. Skin:     General: Skin is warm. Capillary Refill: Capillary refill takes less than 2 seconds. Coloration: Skin is not jaundiced. Findings: No erythema or lesion. Neurological:      General: No focal deficit present. Mental Status: She is alert and oriented to person, place, and time. Mental status is at baseline. Cranial Nerves: No cranial nerve deficit. Coordination: Coordination normal.      Gait: Gait normal.   Psychiatric:         Mood and Affect: Mood normal.         Behavior: Behavior normal.         Thought Content: Thought content normal.         Judgment: Judgment normal.       Assessment& Plan     Diagnosis Orders   1.  Inflammatory arthritis

## 2022-09-16 ENCOUNTER — HOSPITAL ENCOUNTER (OUTPATIENT)
Dept: PHYSICAL THERAPY | Age: 32
Setting detail: THERAPIES SERIES
Discharge: HOME | End: 2022-09-16
Payer: COMMERCIAL

## 2022-09-16 PROCEDURE — 97161 PT EVAL LOW COMPLEX 20 MIN: CPT

## 2022-09-16 NOTE — PROGRESS NOTES
[facility]  PHYSICAL THERAPY EVALUATION    Physical Therapy: Initial Evaluation    Patient: Chalo Sheehan (69 y.o.     female)   Examination Date: 2022   :  1990 ;    Confirmed: Yes MRN: [id number]  CSN: [id number]   Insurance: Payor: Dallin Forresternicko / Plan: Sarabjit Ser / Product Type: *No Product type* /   Insurance ID: [id number] - (Medicaid Managed) Secondary Insurance (if applicable):    Referring Physician: RITA Gunter CNP       Visits to Date/Visits Approved:     No Show/Cancelled Appts: 0 / 0     Medical Diagnosis: Pain in thoracic spine [M54.6]  Other chronic pain [G89.29]  Cervicalgia [M54.2]        Treatment Diagnosis: neck pain, upper back pain, headaches, decreased lifting activity tolerance     PERTINENT MEDICAL HISTORY   Patient Assessed for Rehabilitation Services: Yes       Medical History: Chart Reviewed: Yes   Past Medical History:   Diagnosis Date    Abnormal ultrasound of breast 2016    Acute bronchitis     Allergic rhinitis     Allergic rhinitis     Amenorrhea     Anemia     Arthralgia     Asthma     Bunionette     CTS (carpal tunnel syndrome) 2014    Cystic acne 2014    Fatigue     Fibromyalgia     Dr. Jessi Jiménez    High risk medication use     [facility]     Dr. Jessi Jiménez, on plaquenil    Left breast lump     Low libido     Neck pain     Ovarian cyst 2016    right 3.5 cm    Paresthesia of both hands     Prediabetes 2018    Sciatica      Surgical History:   Past Surgical History:   Procedure Laterality Date    ABDOMINOPLASTY  2014    BREAST ENHANCEMENT SURGERY  2014     SECTION  2006     SECTION  2004    LIPOSUCTION      NOSE SURGERY      TUBAL LIGATION      WRIST SURGERY Right ,     right carpel tunnel release x , Dr. Lalita Bagley       Medications:   Current Outpatient Medications:     Cholecalciferol (VITAMIN D3) 125 MCG (5000 UT) TABS, Take by mouth daily, Disp: , Rfl:     phentermine (ADIPEX-P) 37.5 MG tablet, Take 1 tablet by mouth every morning (before breakfast) for 30 days. , Disp: 30 tablet, Rfl: 0    fluticasone (FLOVENT HFA) 44 MCG/ACT inhaler, Inhale 2 puffs into the lungs in the morning and 2 puffs before bedtime. , Disp: 1 each, Rfl: 3    albuterol sulfate HFA (PROVENTIL;VENTOLIN;PROAIR) 108 (90 Base) MCG/ACT inhaler, Inhale 2 puffs into the lungs every 4 hours as needed for Wheezing, Disp: 8.5 g, Rfl: 1    naproxen (NAPROSYN) 500 MG tablet, Take 1 tablet by mouth in the morning and 1 tablet in the evening. Take with meals. , Disp: 60 tablet, Rfl: 0    tiZANidine (ZANAFLEX) 2 MG tablet, Take 1 tablet by mouth nightly as needed (tension headache), Disp: 30 tablet, Rfl: 0    fluticasone (FLONASE) 50 MCG/ACT nasal spray, 1 spray by Each Nostril route daily, Disp: 1 each, Rfl: 0    nystatin-triamcinolone (MYCOLOG) 813637-0.1 UNIT/GM-% ointment, Apply topically 2 times daily. , Disp: 60 g, Rfl: 1  Allergies: Pcn [penicillins] and Ultram [tramadol hcl]      SUBJECTIVE EXAMINATION     History obtained from[de-identified] Patient, Chart Review,      Family/Caregiver Present: No    Subjective History: Onset Date:  (5 year hx)  Subjective: Pt presents with PT order to address neck and back pain. Pt reports her hands also hurt and swell, causing her to be unable to lift things. Pt reports using prescribed medication each night to sleep because she has headaches. Pt's goal for therapy is to be able to feel better physically and reduce headaches for better sleep. Pt reports she has upcoming appointment with rheumatology to assess for inflammatory diseases. Pt uses cold compresses and medication for pain relief.   Additional Pertinent Hx (if applicable): fibromyalgia, CTS R wrist   Prior diagnostic testing[de-identified] X-ray  Previous treatments prior to current episode?: Medications      Learning/Language: Learning  Does the patient/guardian have any barriers to learning?: Language  Will there be a co-learner?: No  What is the preferred language of the patient/guardian?: Sami, English  Is an  required?: No  How does the patient/guardian prefer to learn new concepts?: Listening, Reading, Demonstration, Pictures/Videos     Pain Screening     See subjective; pt reports pain varies daily    Functional Status    Social History:    Social History  Lives With: Family  Type of Home: House  Home Layout: Two level    Occupation/Interests:   Occupation: Full time employment  Type of Occupation: [address] [place] [address] carrie Talbot 31: caring for 6 dogs, home care, 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    Prior Level of Function:     Independent        Current Level of Function:   Independent      ADL Assistance: Independent  Homemaking Assistance: Independent  Ambulation Assistance: Independent  Transfer Assistance: Independent  Active : Yes         OBJECTIVE EXAMINATION     Review of Systems:  Vision: Impaired  Visual Deficits: Wears glasses, Wears contacts  Hearing: Within functional limits  Follows Commands: Within Functional Limits    VBI Screening / Lumbar Screening:   Oral/facial numbness: No  Dizziness: Yes (3-4x/wk when she has increased pain)  Double Vision: No  Blacking Out: No  Difficulty talking/word choice: No  Difficulty swallowing: No  Nausea/vomiting: No  Tinnitus: No  Generalized Weakness: No     Palpation:   Cervical Spine Palpation: generalized tenderness in muscles  Right Shoulder Palpation: upper trap and periscapular tenderness to palpation  Left Shoulder Palpation: upper trap and periscapular tenderness to palpation    Left AROM  Right AROM      General AROM UE: Right WNL, Left WNL    General AROM UE: Right WNL, Left WNL      General AROM UE: Right WNL, Left WNL    Left Strength  Right Strength         L UE Strength Comment: grossly 4+/5 or better  L UE Strength Comment: grossly 4+/5 or better    R UE Strength Comment: grossly 4+/5 or better  R UE Strength Comment: grossly 4+/5 or better     Cervical Assessment     AROM Cervical Spine   Cervical Spine AROM : WNL           Thoracic Assessment     AROM Thoracic Spine   Thoracic Spine AROM : WNL          Outcomes Score:  Exam: NDI: 14/50; BAUDILIO: 9/50    Treatment:    Exercises:   Exercises  Exercise 1: *UBE  Exercise 2: *cat-cow  Exercise 3: *Rows  Exercise 4: *pull downs  Exercise 5: *chest press  Exercise 6: *chest pulls     Modalities:  Modalities:  (*E-stim, *ultrasound, *CP, *MHP)     Manual:  Manual Therapy  Joint Mobilization: *low grade cervical and thoracic mobs  Manual Traction: *light cervical distraction  Soft Tissue Mobilizaton: *neck, shoulders, upper back  Other: *IDN, *cupping     *Indicates exercise,modality, or manual techniques to be initiated when appropriate       ASSESSMENT     Impression: Assessment: Pt is a 33 yo female with c/o chronic pain throughout her head, neck and shoulders that impairs her tolerance to sleep and lifting activity. Pt notes when pain flares, symptoms radiate down her arms and cause her hands to swell. Pt can benefit from PT services to establish a comprehensive HEP for pain mgmt and improved functional tolerance. Pt can potentially benefit from dry needling to decrease nerve sensitivity and severity of neuromuscular pain. Body Structures, Functions, Activity Limitations Requiring Skilled Therapeutic Intervention: Increased pain    Statement of Medical Necessity: Physical Therapy is both indicated and medically necessary as outlined in the POC to increase the likelihood of meeting the functionally related goals stated below.      Patient's Activity Tolerance: Patient tolerated evaluation without incident      Patient's rehabilitation potential/prognosis is considered to be: Good    Factors which may impact rehabilitation potential include: Medical co-morbidities, Chronicity of problem     Patient Education: PT Role, Plan of Care, Goals, Evaluative findings, Insurance      GOALS     Long Term Goals Completed by 6 weeks Goal Status   LTG 1 Independent with HEP New   LTG 2 Decreased headaches to < 3 nights per week for better sleep New   LTG 3 50% decrease in pain severity to improve pt's QOL for ADLs and recreational activities New   LTG 4 NDI improvement at least 6 points to demo improved functional tolerance New                                  TREATMENT PLAN       Requires PT Follow-Up: Yes    Treatment may include any combination of the following: Integrated dry needling, Home exercise program, Patient/Caregiver education & training, Manual Therapy - Soft Tissue Mobilization, Manual Therapy - Joint Manipulation, Strengthening, Pain management, Modalities, Neuromuscular re-education     Frequency / Duration:  Patient to be seen 1 times per week for 6 weeks  Plan Comment: benefits of exercises for stress and pain mgmt  evaluation submit for insurance authorization          Eval Complexity:   Decision Making: Low Complexity  History: Personal Factors and/or Comorbidities Impacting POC: Medium  History: fibromyalgia, chronic pain, Rt CTS x 2  Examination of body system(s) including body structures and functions, activity limitations, and/or participation restrictions: Low  Exam: NDI: 14/50; BAUDILIO: 9/50  Clinical Presentation: Low  Clinical Presentation: stable    POST-PAIN     Pain Rating (0-10 pain scale):   unreported numeric value /10  Location and pain description same as pre-treatment unless indicated. Action: [] NA  [] Call Physician  [] Perform HEP  [x] Meds as prescribed    Evaluation and patient rights have been reviewed and patient agrees with plan of care.   Yes  [x]  No  []   Explain:     Davis Fall Risk Assessment  Risk Factor Scale  Score   History of Falls [] Yes  [x] No 25  0    Secondary Diagnosis [] Yes  [x] No 15  0    Ambulatory Aid [] Furniture  [] Crutches/cane/walker  [x] None/bedrest/wheelchair/nurse 30  15  0 IV/Heparin Lock [] Yes  [x] No 20  0    Gait/Transferring [] Impaired  [] Weak  [x] Normal/bedrest/immobile 20  10  0    Mental Status [] Forgets limitations  [x] Oriented to own ability 15  0       Total: 0     Based on the Assessment score: check the appropriate box.   [x]  No intervention needed   Low =   Score of 0-24  []  Use standard prevention interventions Moderate =  Score of 24-44   [] Discuss fall prevention strategies   [] Indicate moderate falls risk on eval  []  Use high risk prevention interventions High = Score of 45 and higher   [] Discuss fall prevention strategies   [] Provide supervision during treatment time      Minutes:  PT Individual Minutes  Time In: 0805  Time Out: 0835  Minutes: 30     Procedure Minutes: 30 min eval       Electronically signed by Lauren Boucher PT on 9/16/22 at 8:57 AM EDT

## 2022-09-16 NOTE — PROGRESS NOTES
[address]     Ph: [phone]  Fax: [phone]      [] Certification  [] Recertification [x]  Plan of Care  [] Progress Note [] Discharge      Referring Provider: RITA Gallegos CNP     From:  Abhay Trivedi, PT  Patient: Keily Lopez (34 y.o. female) : 1990 Date: 2022  Medical Diagnosis: Pain in thoracic spine [M54.6]  Other chronic pain [G89.29]  Cervicalgia [M54.2]       Treatment Diagnosis: neck pain, upper back pain, headaches, decreased lifting activity tolerance    Progress Report Period from:  2022  to 2022    Visits to Date: 1 No Show: 0 Cancelled Appts: 0    OBJECTIVE:   Long Term Goals - Time Frame for Long term goals : 6 weeks  Goals Current/ Discharge status Status   Long term goal 1: Independent with HEP No current HEP New   Long term goal 2: Decreased headaches to < 3 nights per week for better sleep Nightly headaches New   Long term goal 3: 50% decrease in pain severity to improve pt's QOL for ADLs and recreational activities Daily pain; neck/back/shoulder blades/hands New   Long term goal 4: NDI improvement at least 6 points to demo improved functional tolerance Exam: NDI: ; BAUDILIO:    New                                                 Body Structures, Functions, Activity Limitations Requiring Skilled Therapeutic Intervention: Increased pain  Assessment: Pt is a 33 yo female with c/o chronic pain throughout her head, neck and shoulders that impairs her tolerance to sleep and lifting activity. Pt notes when pain flares, symptoms radiate down her arms and cause her hands to swell. Pt can benefit from PT services to establish a comprehensive HEP for pain mgmt and improved functional tolerance. Pt can potentially benefit from dry needling to decrease nerve sensitivity and severity of neuromuscular pain.   Therapy Prognosis: Good      PT Education: PT Role;Plan of Care;Goals; Evaluative findings; Insurance  Patient Education: benefits of exercises for stress and pain mgmt    PLAN: [x] Evaluate and Treat  Frequency/Duration:  Plan Frequency: 1  Plan weeks: 6  Current Treatment Recommendations: Integrated dry needling, Home exercise program, Patient/Caregiver education & training, Manual Therapy - Soft Tissue Mobilization, Manual Therapy - Joint Manipulation, Strengthening, Pain management, Modalities, Neuromuscular re-education  Plan Comment: evaluation submit for insurance authorization                     Patient Status:[x] Continue/ Initiate plan of Care    [] Discharge PT. Recommend pt continue with HEP. [] Additional visits requested, Please re-certify for additional visits:    [] Hold         Signature: Electronically signed by Héctor Cabezas PT on 9/16/22 at 8:58 AM EDT      If you have any questions or concerns, please don't hesitate to call. Thank you for your referral.    I have reviewed this plan of care and certify a need for medically necessary rehabilitation services.     Physician Signature:__________________________________________________________  Date:  Please sign and return

## 2022-09-26 ENCOUNTER — HOSPITAL ENCOUNTER (OUTPATIENT)
Dept: PHYSICAL THERAPY | Age: 32
Setting detail: THERAPIES SERIES
Discharge: HOME | End: 2022-09-26
Payer: COMMERCIAL

## 2022-09-26 PROCEDURE — 97140 MANUAL THERAPY 1/> REGIONS: CPT

## 2022-09-26 PROCEDURE — 97110 THERAPEUTIC EXERCISES: CPT

## 2022-09-26 ASSESSMENT — PAIN SCALES - GENERAL: PAINLEVEL_OUTOF10: 5

## 2022-09-26 ASSESSMENT — PAIN DESCRIPTION - PAIN TYPE: TYPE: CHRONIC PAIN

## 2022-09-26 ASSESSMENT — PAIN DESCRIPTION - DESCRIPTORS: DESCRIPTORS: BURNING

## 2022-09-26 ASSESSMENT — PAIN DESCRIPTION - ORIENTATION: ORIENTATION: RIGHT;LEFT

## 2022-09-26 ASSESSMENT — PAIN DESCRIPTION - LOCATION: LOCATION: NECK;HAND

## 2022-09-26 NOTE — PROGRESS NOTES
[facility]  Outpatient Physical Therapy    Treatment Note        Date: 2022  Patient: Torin Hendrickson  : 1990   Confirmed: Yes  MRN: [id number]  Referring Provider: RITA Feliz CNP    Medical Diagnosis: Pain in thoracic spine [M54.6]  Other chronic pain [G89.29]  Cervicalgia [M54.2]       Treatment Diagnosis: neck pain, upper back pain, headaches, decreased lifting activity tolerance    Visit Information:  Insurance: Payor: Jonny Reyes / Plan: Shannon Copper / Product Type: *No Product type* /   PT Visit Information  PT Insurance Information: Beau Garcia  Total # of Visits to Date: 2  Plan of Care/Certification Expiration Date: 10/28/22  No Show: 0  Canceled Appointment: 0  Progress Note Counter:  (2/24 units approved from  to 10/28)    Subjective Information:  Subjective: Patient reports tightness and discomfort. Report she just got bloow work done by rheumatologist and will follow up soon. Patient 12 minutes late to appt. HEP Compliance:  [] Good [] Fair [] Poor [x] Reports not doing due to: hasn't gotten HEP    Pain Screening  Patient Currently in Pain: Yes  Pain Assessment: 0-10  Pain Level: 5  Pain Type: Chronic pain  Pain Location: Neck, Hand  Pain Orientation: Right, Left  Pain Descriptors: Burning (heaviness)    Treatment:  Exercises:  Exercises  Exercise 1: UBE L1 x 4 minutes  Exercise 2: Cat-cow stretch 10s x 5       Manual:   Manual Therapy  Manual Traction: light cervical distraction  Soft Tissue Mobilizaton: STM cervical paraspinals and suboccipitals  Other: IDN consent signed and performed to decrease inflammation and improve MS mechanics; total manual: 18 minutes     *Indicates exercise, modality, or manual techniques to be initiated when appropriate    Objective Measures:        Strength: [x] NT  [] MMT completed:        ROM: [x] NT  [] ROM measurements:             Assessment:    Body Structures, Functions, Activity Limitations Requiring Skilled Therapeutic Intervention: Increased pain  Assessment: Patient reports some relief post PT session. Patient demonstrates increased tightness in cervical region and upper traps responding well to IDN and STM. Continue per POC. Treatment Diagnosis: neck pain, upper back pain, headaches, decreased lifting activity tolerance  Therapy Prognosis: Good          Post-Pain Assessment:       Pain Rating (0-10 pain scale):   4/10   Location and pain description same as pre-treatment unless indicated. Action: [] NA   [] Perform HEP  [x] Meds as prescribed  [x] Modalities as prescribed   [] Call Physician     GOALS   Patient Goal(s):  \"less pain\"      Long Term Goals Completed by 6 weeks Goal Status   LTG 1 Independent with HEP In progress   LTG 2 Decreased headaches to < 3 nights per week for better sleep In progress   LTG 3 50% decrease in pain severity to improve pt's QOL for ADLs and recreational activities In progress   LTG 4 NDI improvement at least 6 points to demo improved functional tolerance In progress       Plan:  Frequency/Duration:  Plan  Plan Frequency: 1  Plan weeks: 6  Current Treatment Recommendations: Integrated dry needling, Home exercise program, Patient/Caregiver education & training, Manual Therapy - Soft Tissue Mobilization, Manual Therapy - Joint Manipulation, Strengthening, Pain management, Modalities, Neuromuscular re-education  Pt to continue current HEP. See objective section for any therapeutic exercise changes, additions or modifications this date.     Therapy Time:      PT Individual Minutes  Time In: 3671  Time Out: 1000  Minutes: 28  Timed Code Treatment Minutes: 28 Minutes  Procedure Minutes:0 minutes  Timed Activity Minutes Units   Ther Ex 10 1   Manual  18 1     Electronically signed by Juan Alberto Squires PT on 9/26/22 at 12:47 PM EDT

## 2022-10-03 ENCOUNTER — HOSPITAL ENCOUNTER (OUTPATIENT)
Dept: PHYSICAL THERAPY | Age: 32
Setting detail: THERAPIES SERIES
Discharge: HOME | End: 2022-10-03
Payer: COMMERCIAL

## 2022-10-03 PROCEDURE — 97110 THERAPEUTIC EXERCISES: CPT

## 2022-10-03 PROCEDURE — 97140 MANUAL THERAPY 1/> REGIONS: CPT

## 2022-10-03 ASSESSMENT — PAIN DESCRIPTION - PAIN TYPE: TYPE: CHRONIC PAIN

## 2022-10-03 ASSESSMENT — PAIN DESCRIPTION - ORIENTATION: ORIENTATION: LEFT;RIGHT

## 2022-10-03 ASSESSMENT — PAIN SCALES - GENERAL: PAINLEVEL_OUTOF10: 4

## 2022-10-03 ASSESSMENT — PAIN DESCRIPTION - LOCATION: LOCATION: NECK

## 2022-10-03 ASSESSMENT — PAIN DESCRIPTION - DESCRIPTORS: DESCRIPTORS: BURNING

## 2022-10-03 NOTE — PROGRESS NOTES
[facility]  Outpatient Physical Therapy    Treatment Note        Date: 10/3/2022  Patient: Malcom Noriega  : 1990   Confirmed: Yes  MRN: [id number]  Referring Provider: Candance Margo, APRN - CNP    Medical Diagnosis: Pain in thoracic spine [M54.6]  Other chronic pain [G89.29]  Cervicalgia [M54.2]       Treatment Diagnosis: neck pain, upper back pain, headaches, decreased lifting activity tolerance    Visit Information:  Insurance: Payor: Ronny Lyon / Plan: Ketan Squires / Product Type: *No Product type* /   PT Visit Information  PT Insurance Information: [facility]  Total # of Visits to Date: 3  Plan of Care/Certification Expiration Date: 10/28/22  No Show: 0  Canceled Appointment: 0  Progress Note Counter: 2/6 (/24 units approved from  to 10/28)    Subjective Information:  Subjective: Patient reports she felt sore for 2 days after therapy but got better. Reports she did like the dry needling. HEP Compliance:  [x] Good [] Fair [] Poor [] Reports not doing due to:    Pain Screening  Patient Currently in Pain: Yes  Pain Assessment: 0-10  Pain Level: 4  Pain Type: Chronic pain  Pain Location: Neck  Pain Orientation: Left, Right  Pain Descriptors: Burning    Treatment:  Exercises:  Exercises  Exercise 1: UBE L1.5 x 4 minutes (forward and reverse 2 minutes each)  Exercise 2: Cat-cow stretch 10s x 5  Exercise 3: YTB rows x 15  Exercise 4: lat pull downs YTB x 15     Manual:   Manual Therapy  Manual Traction: light cervical distraction  Soft Tissue Mobilizaton: STM cervical paraspinals and suboccipitals  Other: IDN consent signed and performed to decrease inflammation and improve MS mechanics; total manual: 17 minutes       *Indicates exercise, modality, or manual techniques to be initiated when appropriate    Objective Measures:        Strength: [x] NT  [] MMT completed:           ROM: [x] NT  [] ROM measurements:             Assessment:    Body Structures, Functions, Activity Limitations Requiring Skilled Therapeutic Intervention: Increased pain  Assessment: Patient demonstrates good tolerance with exercises without increasing pain. Continued to work on decreasing tightness in cervical musculature with IDN and STM. Patient reports some relief post treatment. Treatment Diagnosis: neck pain, upper back pain, headaches, decreased lifting activity tolerance  Therapy Prognosis: Good          Post-Pain Assessment:       Pain Rating (0-10 pain scale):   3/10   Location and pain description same as pre-treatment unless indicated. Action: [] NA   [] Perform HEP  [] Meds as prescribed  [] Modalities as prescribed   [] Call Physician     GOALS   Patient Goal(s):  \"decrease pain\"      Long Term Goals Completed by 6 weeks Goal Status   LTG 1 Independent with HEP In progress   LTG 2 Decreased headaches to < 3 nights per week for better sleep In progress   LTG 3 50% decrease in pain severity to improve pt's QOL for ADLs and recreational activities In progress   LTG 4 NDI improvement at least 6 points to demo improved functional tolerance In progress       Plan:  Frequency/Duration:  Plan  Plan Frequency: 1  Plan weeks: 6  Current Treatment Recommendations: Integrated dry needling, Home exercise program, Patient/Caregiver education & training, Manual Therapy - Soft Tissue Mobilization, Manual Therapy - Joint Manipulation, Strengthening, Pain management, Modalities, Neuromuscular re-education  Pt to continue current HEP. See objective section for any therapeutic exercise changes, additions or modifications this date.     Therapy Time:      PT Individual Minutes  Time In: 1004  Time Out: 6720  Minutes: 38  Timed Code Treatment Minutes: 38 Minutes  Procedure Minutes:0 minutes  Timed Activity Minutes Units   Ther Ex 21 2   Manual  17 1     Electronically signed by Jet Porter PT on 10/3/22 at 12:29 PM EDT

## 2022-10-06 ENCOUNTER — OFFICE VISIT (OUTPATIENT)
Dept: FAMILY MEDICINE CLINIC | Age: 32
End: 2022-10-06
Payer: COMMERCIAL

## 2022-10-06 VITALS
SYSTOLIC BLOOD PRESSURE: 112 MMHG | DIASTOLIC BLOOD PRESSURE: 88 MMHG | HEIGHT: 57 IN | WEIGHT: 154 LBS | OXYGEN SATURATION: 99 % | HEART RATE: 75 BPM | BODY MASS INDEX: 33.22 KG/M2

## 2022-10-06 DIAGNOSIS — E66.9 CLASS 1 OBESITY WITH SERIOUS COMORBIDITY AND BODY MASS INDEX (BMI) OF 33.0 TO 33.9 IN ADULT, UNSPECIFIED OBESITY TYPE: ICD-10-CM

## 2022-10-06 DIAGNOSIS — R73.03 PREDIABETES: Primary | ICD-10-CM

## 2022-10-06 DIAGNOSIS — M19.90 INFLAMMATORY ARTHRITIS: ICD-10-CM

## 2022-10-06 PROCEDURE — 99213 OFFICE O/P EST LOW 20 MIN: CPT | Performed by: NURSE PRACTITIONER

## 2022-10-06 PROCEDURE — G8484 FLU IMMUNIZE NO ADMIN: HCPCS | Performed by: NURSE PRACTITIONER

## 2022-10-06 PROCEDURE — G8417 CALC BMI ABV UP PARAM F/U: HCPCS | Performed by: NURSE PRACTITIONER

## 2022-10-06 PROCEDURE — 1036F TOBACCO NON-USER: CPT | Performed by: NURSE PRACTITIONER

## 2022-10-06 PROCEDURE — G8427 DOCREV CUR MEDS BY ELIG CLIN: HCPCS | Performed by: NURSE PRACTITIONER

## 2022-10-06 RX ORDER — MELOXICAM 15 MG/1
15 TABLET ORAL DAILY PRN
COMMUNITY
Start: 2022-09-22 | End: 2022-12-21

## 2022-10-06 ASSESSMENT — ENCOUNTER SYMPTOMS
ABDOMINAL PAIN: 0
SHORTNESS OF BREATH: 0
DIARRHEA: 0
EYE PAIN: 0
CONSTIPATION: 0
COUGH: 0
COLOR CHANGE: 0
TROUBLE SWALLOWING: 0
CHEST TIGHTNESS: 0
BACK PAIN: 0

## 2022-10-06 NOTE — PROGRESS NOTES
Subjective  Chief Complaint   Patient presents with    Arthritis     4 week f/u, pt states she is tired sometimes but also sometimes feels better. She thinks the new medication (naproxen) is helping        HPI    Pt here for follow up on arthritis and obesity. Saw rheum, had additional lab work and started on meloxicam. Feeling much better. Has been going to the gym and feels that is helping her as well. Has f/u with them on 10/19. Has lost an additional 2 pounds.      Past Medical History:   Diagnosis Date    Abnormal ultrasound of breast 2016    Acute bronchitis     Allergic rhinitis     Allergic rhinitis     Amenorrhea     Anemia     Arthralgia     Asthma     Bunionette     CTS (carpal tunnel syndrome) 2014    Cystic acne 2014    Fatigue     Fibromyalgia     Dr. Manuela Dubin    High risk medication use     Inflammatory polyarthropathy ([address]     Dr. Manuela Dubin, on plaquenil    Left breast lump     Low libido     Neck pain     Ovarian cyst 2016    right 3.5 cm    Paresthesia of both hands     Prediabetes 2018    Sciatica      Patient Active Problem List    Diagnosis Date Noted    Prediabetes 2018    Amenorrhea     Low libido     High risk medication use     Abnormal ultrasound of breast 2016    Left breast lump     Fibromyalgia     Inflammatory polyarthropathy (HCC)     Ovarian cyst 2016    Paresthesia of both hands     Allergic rhinitis     Asthma     Cystic acne 2014     Past Surgical History:   Procedure Laterality Date    ABDOMINOPLASTY  2014    BREAST ENHANCEMENT SURGERY  2014     SECTION  2006     SECTION  2004    LIPOSUCTION  2014    NOSE SURGERY      TUBAL LIGATION      WRIST SURGERY Right ,     right carpel tunnel release x , Dr. Ellis Edwards     Family History   Problem Relation Age of Onset    Arthritis Mother         Rheumatoid Arthritis and fibromyalgia    Arthritis Father     High Blood Pressure Maternal Grandfather     Diabetes Maternal Grandfather     Heart Disease Maternal Grandfather     No Known Problems Paternal Grandfather     No Known Problems Paternal Grandmother     No Known Problems Maternal Grandmother     No Known Problems Brother     No Known Problems Sister     No Known Problems Other     Breast Cancer Neg Hx     Cancer Neg Hx     Colon Cancer Neg Hx     Eclampsia Neg Hx     Hypertension Neg Hx     Ovarian Cancer Neg Hx      Labor Neg Hx     Spont Abortions Neg Hx     Stroke Neg Hx      Social History     Socioeconomic History    Marital status:      Spouse name: None    Number of children: 2    Years of education: None    Highest education level: None   Occupational History    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   Tobacco Use    Smoking status: Never    Smokeless tobacco: Never   Vaping Use    Vaping Use: Never used   Substance and Sexual Activity    Alcohol use: Yes     Alcohol/week: 4.2 standard drinks     Types: 5 Standard drinks or equivalent per week     Comment: social    Drug use: No    Sexual activity: Yes     Partners: Male     Social Determinants of Health     Financial Resource Strain: Low Risk     Difficulty of Paying Living Expenses: Not hard at all   Food Insecurity: No Food Insecurity    Worried About [address] in the Last Year: Never true    [facility] in the Last Year: Never true     Current Outpatient Medications on File Prior to Visit   Medication Sig Dispense Refill    meloxicam (MOBIC) 15 MG tablet Take 15 mg by mouth daily as needed      Cholecalciferol (VITAMIN D3) 125 MCG (5000 UT) TABS Take by mouth daily      phentermine (ADIPEX-P) 37.5 MG tablet Take 1 tablet by mouth every morning (before breakfast) for 30 days. 30 tablet 0    fluticasone (FLOVENT HFA) 44 MCG/ACT inhaler Inhale 2 puffs into the lungs in the morning and 2 puffs before bedtime.  1 each 3    albuterol sulfate HFA (PROVENTIL;VENTOLIN;PROAIR) 108 (90 Base) MCG/ACT inhaler Inhale 2 puffs into the lungs every 4 hours as needed for Wheezing 8.5 g 1    naproxen (NAPROSYN) 500 MG tablet Take 1 tablet by mouth in the morning and 1 tablet in the evening. Take with meals. 60 tablet 0    tiZANidine (ZANAFLEX) 2 MG tablet Take 1 tablet by mouth nightly as needed (tension headache) 30 tablet 0    fluticasone (FLONASE) 50 MCG/ACT nasal spray 1 spray by Each Nostril route daily 1 each 0    nystatin-triamcinolone (MYCOLOG) 846818-1.1 UNIT/GM-% ointment Apply topically 2 times daily. (Patient not taking: Reported on 10/6/2022) 60 g 1     No current facility-administered medications on file prior to visit. Allergies   Allergen Reactions    Pcn [Penicillins] Hives, Shortness Of Breath, Anaphylaxis, Itching and Rash    Ultram [Tramadol Hcl] Other (See Comments)     Made her feel funny in her hand       Review of Systems   Constitutional:  Negative for activity change, appetite change, chills, diaphoresis, fatigue and fever. HENT:  Negative for congestion, ear pain, hearing loss and trouble swallowing. Eyes:  Negative for pain and visual disturbance. Respiratory:  Negative for cough, chest tightness and shortness of breath. Cardiovascular:  Negative for chest pain, palpitations and leg swelling. Gastrointestinal:  Negative for abdominal pain, constipation and diarrhea. Endocrine: Negative for polydipsia, polyphagia and polyuria. Genitourinary:  Negative for difficulty urinating and dysuria. Musculoskeletal:  Negative for arthralgias and back pain. Skin:  Negative for color change and rash. Neurological:  Negative for dizziness and light-headedness. Psychiatric/Behavioral:  Negative for dysphoric mood. The patient is not nervous/anxious. Objective  Vitals:    10/06/22 1007   BP: 112/88   Site: Left Upper Arm   Position: Sitting   Cuff Size: Medium Adult   Pulse: 75   SpO2: 99%   Weight: 154 lb (69.9 kg)   Height: 4' 9\" (1.448 m)     Physical Exam  Constitutional:       General: She is not in acute distress.      Appearance: Normal appearance. She is obese. She is not ill-appearing, toxic-appearing or diaphoretic. HENT:      Head: Normocephalic and atraumatic. Right Ear: External ear normal.      Left Ear: External ear normal.      Nose: Nose normal. No congestion or rhinorrhea. Eyes:      Extraocular Movements: Extraocular movements intact. Conjunctiva/sclera: Conjunctivae normal.      Pupils: Pupils are equal, round, and reactive to light. Cardiovascular:      Rate and Rhythm: Normal rate and regular rhythm. Pulses: Normal pulses. Heart sounds: Normal heart sounds. No murmur heard. Pulmonary:      Effort: Pulmonary effort is normal. No respiratory distress. Breath sounds: Normal breath sounds. No stridor. No wheezing, rhonchi or rales. Chest:      Chest wall: No tenderness. Musculoskeletal:         General: Normal range of motion. Cervical back: Normal range of motion and neck supple. No tenderness. Right lower leg: No edema. Left lower leg: No edema. Lymphadenopathy:      Cervical: No cervical adenopathy. Skin:     General: Skin is warm. Capillary Refill: Capillary refill takes less than 2 seconds. Coloration: Skin is not jaundiced. Findings: No erythema or lesion. Neurological:      General: No focal deficit present. Mental Status: She is alert and oriented to person, place, and time. Mental status is at baseline. Cranial Nerves: No cranial nerve deficit. Coordination: Coordination normal.      Gait: Gait normal.   Psychiatric:         Mood and Affect: Mood normal.         Behavior: Behavior normal.         Thought Content: Thought content normal.         Judgment: Judgment normal.       Assessment& Plan     Diagnosis Orders   1. Prediabetes        2. Inflammatory arthritis        3.  Class 1 obesity with serious comorbidity and body mass index (BMI) of 33.0 to 33.9 in adult, unspecified obesity type          Continue with healthy eating and exercise. Continue to follow with rheum. F/u in 6 months or sooner PRN. Side effects, adverse effects of the medication prescribed today, as well as treatment plan/ rationale and result expectations have been discussed with the patient who expresses understanding and desires to proceed. Close follow up to evaluate treatment results and for coordination of care. I have reviewed the patient's medical history in detail and updated the computerized patient record. As always, patient is advised that if symptoms worsen in any way they will proceed to the nearest emergency room. No orders of the defined types were placed in this encounter. No orders of the defined types were placed in this encounter. There are no discontinued medications. Return in about 6 months (around 4/6/2023) for check up, prediabetes.     Shelia Medina, RITA - CNP

## 2022-10-10 ENCOUNTER — HOSPITAL ENCOUNTER (OUTPATIENT)
Dept: PHYSICAL THERAPY | Age: 32
Setting detail: THERAPIES SERIES
Discharge: HOME | End: 2022-10-10
Payer: COMMERCIAL

## 2022-10-10 PROCEDURE — 97110 THERAPEUTIC EXERCISES: CPT

## 2022-10-10 NOTE — PROGRESS NOTES
[facility]  Outpatient Physical Therapy    Treatment Note        Date: 10/10/2022  Patient: Lavelle December  : 1990   Confirmed: Yes  MRN: [id number]  Referring Provider: RIAT Leung CNP    Medical Diagnosis: Pain in thoracic spine [M54.6]  Other chronic pain [G89.29]  Cervicalgia [M54.2]       Treatment Diagnosis: neck pain, upper back pain, headaches, decreased lifting activity tolerance    Visit Information:  Insurance: Payor: Pastor García / Plan: Sebastian Jaquez / Product Type: *No Product type* /   PT Visit Information  PT Insurance Information: [address] [place] [address]  Total # of Visits to Date: 4  Plan of Care/Certification Expiration Date: 10/28/22  No Show: 0  Canceled Appointment: 0  Progress Note Counter: 3/ ( units approved from  to 10/28)    Subjective Information:  Subjective: Pt reports she has been feeling good since last week, noting a decrease in headaches and general pain. Pt reports she had a headache waking up this AM, but symptoms ended after taking a shower. Pt notes when she works out her veins in her R arm become prominent and are painful to touch. HEP Compliance:  [x] Good [] Fair [] Poor [] Reports not doing due to:    Pain Screening  Patient Currently in Pain: No    Treatment:  Exercises:  Exercises  Exercise 1: UBE: L3 x 6 min (3/3)  Exercise 2: Rows: BTB x 15  Exercise 3: Lat Pull Downs: RTB x 15  Exercise 4: Rev. Fly Chest pulls: RTB x 15  Exercise 5: Y- pulls: YTB x 15  Exercise 6: Prone over p-ball: T-Y-I's x 15 ea (2# DB)  Exercise 8: HEP: TYIs, Rows     *Indicates exercise, modality, or manual techniques to be initiated when appropriate    Objective Measures:      Strength: [x] NT  [] MMT completed:    ROM: [x] NT  [] ROM measurements:    Assessment:    Body Structures, Functions, Activity Limitations Requiring Skilled Therapeutic Intervention: Increased pain  Assessment: Pt reports improving neck pain symptoms and decrease in weekly headaches. Pt notes she has started an exercise program which she believes is helping manage symptoms as well. Further reviewed postural strengthening pt can implement into HEP. Good mechanics and tolerance to all exercises w/o pain onset. Plan to further implement IDN and STM NV is pain symptoms return. Treatment Diagnosis: neck pain, upper back pain, headaches, decreased lifting activity tolerance  Therapy Prognosis: Good          Post-Pain Assessment:       Pain Rating (0-10 pain scale):  0 /10   Location and pain description same as pre-treatment unless indicated. Action: [x] NA   [] Perform HEP  [] Meds as prescribed  [] Modalities as prescribed   [] Call Physician     GOALS     Long Term Goals Completed by 6 weeks Goal Status   LTG 1 Independent with HEP In progress   LTG 2 Decreased headaches to < 3 nights per week for better sleep In progress   LTG 3 50% decrease in pain severity to improve pt's QOL for ADLs and recreational activities In progress   LTG 4 NDI improvement at least 6 points to demo improved functional tolerance In progress                                    Plan:  Frequency/Duration:  Plan  Plan Frequency: 1  Plan weeks: 6  Current Treatment Recommendations: Integrated dry needling, Home exercise program, Patient/Caregiver education & training, Manual Therapy - Soft Tissue Mobilization, Manual Therapy - Joint Manipulation, Strengthening, Pain management, Modalities, Neuromuscular re-education  Pt to continue current HEP. See objective section for any therapeutic exercise changes, additions or modifications this date.     Therapy Time:      PT Individual Minutes  Time In: 0105  Time Out: 1000  Minutes: 35  Timed Code Treatment Minutes: 33 Minutes  Procedure Minutes: 0  Timed Activity Minutes Units   Ther Ex 33 2          Electronically signed by Brie Rodriguez, PT on 10/10/22 at 10:07 AM EDT

## 2022-10-17 ENCOUNTER — HOSPITAL ENCOUNTER (OUTPATIENT)
Dept: PHYSICAL THERAPY | Age: 32
Setting detail: THERAPIES SERIES
Discharge: HOME | End: 2022-10-17
Payer: COMMERCIAL

## 2022-10-17 PROCEDURE — 97110 THERAPEUTIC EXERCISES: CPT

## 2022-10-17 PROCEDURE — 97140 MANUAL THERAPY 1/> REGIONS: CPT

## 2022-10-17 ASSESSMENT — PAIN SCALES - GENERAL: PAINLEVEL_OUTOF10: 3

## 2022-10-17 ASSESSMENT — PAIN DESCRIPTION - DESCRIPTORS: DESCRIPTORS: DISCOMFORT

## 2022-10-17 ASSESSMENT — PAIN DESCRIPTION - LOCATION: LOCATION: SHOULDER

## 2022-10-17 ASSESSMENT — PAIN DESCRIPTION - ORIENTATION: ORIENTATION: RIGHT;LEFT

## 2022-10-17 NOTE — PROGRESS NOTES
[x] NT  [] ROM measurements:    Assessment: Body Structures, Functions, Activity Limitations Requiring Skilled Therapeutic Intervention: Increased pain  Assessment: Muscle tension noted throughout pt's addis UTs. Stretches and education on STM provided so pt can implement soft tissue work into [employer]. Continued IDN treatment to reduce pain. Modalities declined by patient after session. Pt recommended to trial heat or ice if soreness arises later today and to stay activity with HEP/exercises to also help alleviate muscle tension. Plan to assess progress toward all goals NV to determine continuation of services or D/C to HEP. Treatment Diagnosis: neck pain, upper back pain, headaches, decreased lifting activity tolerance  Therapy Prognosis: Good          Post-Pain Assessment:       Pain Rating (0-10 pain scale):   no increase in pain reported /10   Location and pain description same as pre-treatment unless indicated. Action: [] NA   [] Perform HEP  [] Meds as prescribed  [x] Modalities as prescribed   [] Call Physician     GOALS     Long Term Goals Completed by 6 weeks Goal Status   LTG 1 Independent with HEP In progress   LTG 2 Decreased headaches to < 3 nights per week for better sleep In progress   LTG 3 50% decrease in pain severity to improve pt's QOL for ADLs and recreational activities In progress   LTG 4 NDI improvement at least 6 points to demo improved functional tolerance In progress          Plan:  Frequency/Duration:  Plan  Plan Frequency: 1  Plan weeks: 6  Current Treatment Recommendations: Integrated dry needling, Home exercise program, Patient/Caregiver education & training, Manual Therapy - Soft Tissue Mobilization, Manual Therapy - Joint Manipulation, Strengthening, Pain management, Modalities, Neuromuscular re-education  Pt to continue current HEP. See objective section for any therapeutic exercise changes, additions or modifications this date.     Therapy Time:      PT Individual Minutes  Time In: 9575  Time Out: 1002  Minutes: 25  Timed Code Treatment Minutes: 23 Minutes  Procedure Minutes: 0  Timed Activity Minutes Units   Ther Ex 8 1   Manual  15 1     Electronically signed by Lucinda Bartlett PT on 10/17/22 at 10:07 AM EDT

## 2022-10-24 ENCOUNTER — HOSPITAL ENCOUNTER (OUTPATIENT)
Dept: PHYSICAL THERAPY | Age: 32
Setting detail: THERAPIES SERIES
End: 2022-10-24
Payer: COMMERCIAL

## 2022-10-24 NOTE — PROGRESS NOTES
Therapy                            Cancellation/No-show Note    Date: 10/24/2022  Patient: Glo Hughes (72 y.o. female)  : 1990  MRN:  [id number]  Referring Physician: RITA Bourne Chi - CNP    Medical Diagnosis: Pain in thoracic spine [M54.6]  Other chronic pain [G89.29]  Cervicalgia [M54.2]      Visit Information:  Insurance: Payor: Maria R Hoyos / Plan: Jamie Solano / Product Type: *No Product type* /   Visits to Date: 5   No Show/Cancelled Appts: 0 /       For today's appointment patient:  [x]  Cancelled 10/24/22  []  Rescheduled appointment  []  No-show   []  Called pt to remind of next appointment     Reason given by patient:  [x]  Patient ill  []  Conflicting appointment  []  No transportation    []  Conflict with work  []  No reason given  []  Other:      [] Pt has future appointments scheduled, no follow up needed  [] Pt requests to be on hold.     Reason:   If > 2 weeks please discuss with therapist.  [x] Therapist to call pt for follow up       Signature: Electronically signed by Elias Biswas PT on 10/24/22 at 10:11 AM EDT

## 2022-11-16 DIAGNOSIS — M19.90 INFLAMMATORY ARTHRITIS: ICD-10-CM

## 2022-11-16 NOTE — TELEPHONE ENCOUNTER
Last Office Visit (last PCP visit):   10/6/22    Next Visit Date:  Future Appointments   Date Time Provider Gosia Cole   3/2/2023  9:30 AM Ebbie Mends, MD [facility]   4/6/2023  8:00 AM RITA Arreola - CNP [facility]       **If hasn't been seen in over a year OR hasn't followed up according to last diabetes/ADHD visit, make appointment for patient before sending refill to provider.     Rx requested:  Requested Prescriptions     Pending Prescriptions Disp Refills    naproxen (NAPROSYN) 500 MG tablet [Pharmacy Med Name: NAPROXEN 500 MG TABLET] 60 tablet 0     Sig: take 1 tablet by mouth every morning with food and take 1 tablet by mouth every evening with food

## 2022-11-17 RX ORDER — NAPROXEN 500 MG/1
TABLET ORAL
Qty: 60 TABLET | Refills: 0 | Status: SHIPPED | OUTPATIENT
Start: 2022-11-17

## 2022-11-23 NOTE — PROGRESS NOTES
[address] [address]                                  Ph: [phone]  Fax: [phone]        [] Certification  [] Recertification     []  Plan of Care  [] Progress Note [x] Discharge                            Referring Provider: RITA Mak CNP           From:  Kamini French, PT  Patient: Karli Guillen (34 y.o. female) : 1990 Date: 2022  Medical Diagnosis: Pain in thoracic spine [M54.6]  Other chronic pain [G89.29]  Cervicalgia [M54.2]       Treatment Diagnosis: neck pain, upper back pain, headaches, decreased lifting activity tolerance     Progress Report Period from:  2022  to 10/24/2022     Visits to Date: 5 No Show: 0 Cancelled Appts: 1     OBJECTIVE:   Long Term Goals - Time Frame for Long term goals : 6 weeks  Goals Current/ Discharge status Status   Long term goal 1: Independent with HEP HEP provided    Long term goal 2: Decreased headaches to < 3 nights per week for better sleep Decrease frequency of headaches subjectively reported    Long term goal 3: 50% decrease in pain severity to improve pt's QOL for ADLs and recreational activities Pain ranging 0-3/10 over last 2 sessions    Long term goal 4: NDI improvement at least 6 points to demo improved functional tolerance Unable to assess d/t cancellation of last authorized PT visit      Body Structures, Functions, Activity Limitations Requiring Skilled Therapeutic Intervention: Increased pain  Assessment: Pt completed PT eval and 4 treatment sessions. Improvement in pain and headaches verbalized by patient. HEP provided with education on soft tissue massage. Pt has surpassed last insurance authorized date for therapy as of 10/28/22. D/C PT POC. Recommend pt continue with HEP.       PLAN:   Frequency/Duration:  D/C PT POC                                        Patient Status:[] Continue/ Initiate plan of Care [x] Discharge PT. Recommend pt continue with HEP. [] Additional visits requested, Please re-certify for additional visits:                          [] Hold                                                     Signature: Electronically signed by Terrance Carreon PT on 11/23/2022 at 5:28 PM          If you have any questions or concerns, please don't hesitate to call.   Thank you for your referral.

## 2022-12-13 ENCOUNTER — OFFICE VISIT (OUTPATIENT)
Dept: FAMILY MEDICINE CLINIC | Age: 32
End: 2022-12-13
Payer: COMMERCIAL

## 2022-12-13 VITALS
BODY MASS INDEX: 33.44 KG/M2 | HEART RATE: 66 BPM | WEIGHT: 155 LBS | HEIGHT: 57 IN | DIASTOLIC BLOOD PRESSURE: 74 MMHG | OXYGEN SATURATION: 99 % | SYSTOLIC BLOOD PRESSURE: 116 MMHG

## 2022-12-13 DIAGNOSIS — R51.9 NOCTURNAL HEADACHES: ICD-10-CM

## 2022-12-13 DIAGNOSIS — J45.21 MILD INTERMITTENT ASTHMA WITH ACUTE EXACERBATION: Primary | ICD-10-CM

## 2022-12-13 PROCEDURE — 1036F TOBACCO NON-USER: CPT | Performed by: NURSE PRACTITIONER

## 2022-12-13 PROCEDURE — G8427 DOCREV CUR MEDS BY ELIG CLIN: HCPCS | Performed by: NURSE PRACTITIONER

## 2022-12-13 PROCEDURE — G8417 CALC BMI ABV UP PARAM F/U: HCPCS | Performed by: NURSE PRACTITIONER

## 2022-12-13 PROCEDURE — 99214 OFFICE O/P EST MOD 30 MIN: CPT | Performed by: NURSE PRACTITIONER

## 2022-12-13 PROCEDURE — G8484 FLU IMMUNIZE NO ADMIN: HCPCS | Performed by: NURSE PRACTITIONER

## 2022-12-13 RX ORDER — METHYLPREDNISOLONE 4 MG/1
TABLET ORAL
Qty: 21 TABLET | Refills: 0 | Status: SHIPPED | OUTPATIENT
Start: 2022-12-13 | End: 2022-12-19

## 2022-12-13 RX ORDER — LANOLIN ALCOHOL/MO/W.PET/CERES
CREAM (GRAM) TOPICAL
COMMUNITY
Start: 2022-10-19

## 2022-12-13 ASSESSMENT — ENCOUNTER SYMPTOMS
TROUBLE SWALLOWING: 0
EYE PAIN: 0
SHORTNESS OF BREATH: 1
ABDOMINAL PAIN: 0
DIARRHEA: 0
COUGH: 0
BACK PAIN: 0
COLOR CHANGE: 0
CONSTIPATION: 0
CHEST TIGHTNESS: 1
WHEEZING: 1

## 2022-12-13 NOTE — PROGRESS NOTES
Subjective  Chief Complaint   Patient presents with    Asthma     Last week pt was sick but no positive viral tests & her asthma is bothering her all day and night    Headache     Pt takes naproxen for her headaches every night    Health Maintenance     Pt declines flu vax       HPI    Discuss concerns as above. Asthma-had viral uri last week, asthma flared up after that. Worse at night, feels like \"I can't breathe at night\". Wheezing, shortness of breath, coughing. Worse after exercising also. Not really getting any relief from albuterol inhaler. Headaches nightly, no longer having pain in neck/back. Only at night when she lays down. Mainly located in occipital region. Scheduled to see neuro in February. Naprosyn helps but it wears off. Really seem to worsened since her asthma has been worsening as well.      Past Medical History:   Diagnosis Date    Abnormal ultrasound of breast 2016    Acute bronchitis     Allergic rhinitis     Allergic rhinitis     Amenorrhea     Anemia     Arthralgia     Asthma     Bunionette     CTS (carpal tunnel syndrome) 2014    Cystic acne 2014    Fatigue     Fibromyalgia     Dr. Brian Melo    High risk medication use     Inflammatory polyarthropathy ([address]     Dr. Brian Melo, on plaquenil    Left breast lump     Low libido     Neck pain     Ovarian cyst 2016    right 3.5 cm    Paresthesia of both hands     Prediabetes 2018    Sciatica      Patient Active Problem List    Diagnosis Date Noted    Prediabetes 2018    Amenorrhea     Low libido     High risk medication use     Abnormal ultrasound of breast 2016    Left breast lump     Fibromyalgia     Inflammatory polyarthropathy (HCC)     Ovarian cyst 2016    Paresthesia of both hands     Allergic rhinitis     Asthma     Cystic acne 2014     Past Surgical History:   Procedure Laterality Date    ABDOMINOPLASTY  2014    BREAST ENHANCEMENT SURGERY  2014     SECTION  2006     SECTION 2004    LIPOSUCTION  2014    NOSE SURGERY      TUBAL LIGATION      WRIST SURGERY Right ,     right carpel tunnel release x , Dr. Madeline Dempsey     Family History   Problem Relation Age of Onset    Arthritis Mother         Rheumatoid Arthritis and fibromyalgia    Arthritis Father     High Blood Pressure Maternal Grandfather     Diabetes Maternal Grandfather     Heart Disease Maternal Grandfather     No Known Problems Paternal Grandfather     No Known Problems Paternal Grandmother     No Known Problems Maternal Grandmother     No Known Problems Brother     No Known Problems Sister     No Known Problems Other     Breast Cancer Neg Hx     Cancer Neg Hx     Colon Cancer Neg Hx     Eclampsia Neg Hx     Hypertension Neg Hx     Ovarian Cancer Neg Hx      Labor Neg Hx     Spont Abortions Neg Hx     Stroke Neg Hx      Social History     Socioeconomic History    Marital status:      Spouse name: None    Number of children: 2    Years of education: None    Highest education level: None   Occupational History    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   Tobacco Use    Smoking status: Never    Smokeless tobacco: Never   Vaping Use    Vaping Use: Never used   Substance and Sexual Activity    Alcohol use:  Yes     Alcohol/week: 4.2 standard drinks     Types: 5 Standard drinks or equivalent per week     Comment: social    Drug use: No    Sexual activity: Yes     Partners: Male     Social Determinants of Health     Financial Resource Strain: Low Risk     Difficulty of Paying Living Expenses: Not hard at all   Food Insecurity: No Food Insecurity    Worried About Running Out of Food in the Last Year: Never true    [address] in the Last Year: Never true     Current Outpatient Medications on File Prior to Visit   Medication Sig Dispense Refill    vitamin B-12 (CYANOCOBALAMIN) 1000 MCG tablet       naproxen (NAPROSYN) 500 MG tablet take 1 tablet by mouth every morning with food and take 1 tablet by mouth every evening with food 60 tablet 0    meloxicam (MOBIC) 15 MG tablet Take 15 mg by mouth daily as needed      Cholecalciferol (VITAMIN D3) 125 MCG (5000 UT) TABS Take by mouth daily      fluticasone (FLOVENT HFA) 44 MCG/ACT inhaler Inhale 2 puffs into the lungs in the morning and 2 puffs before bedtime. 1 each 3    albuterol sulfate HFA (PROVENTIL;VENTOLIN;PROAIR) 108 (90 Base) MCG/ACT inhaler Inhale 2 puffs into the lungs every 4 hours as needed for Wheezing 8.5 g 1    tiZANidine (ZANAFLEX) 2 MG tablet Take 1 tablet by mouth nightly as needed (tension headache) 30 tablet 0    fluticasone (FLONASE) 50 MCG/ACT nasal spray 1 spray by Each Nostril route daily 1 each 0     No current facility-administered medications on file prior to visit. Allergies   Allergen Reactions    Pcn [Penicillins] Hives, Shortness Of Breath, Anaphylaxis, Itching and Rash    Ultram [Tramadol Hcl] Other (See Comments)     Made her feel funny in her hand       Review of Systems   Constitutional:  Negative for activity change, appetite change, chills, diaphoresis, fatigue and fever. HENT:  Negative for congestion, ear pain, hearing loss and trouble swallowing. Eyes:  Negative for pain and visual disturbance. Respiratory:  Positive for chest tightness, shortness of breath and wheezing. Negative for cough. Cardiovascular:  Negative for chest pain, palpitations and leg swelling. Gastrointestinal:  Negative for abdominal pain, constipation and diarrhea. Endocrine: Negative for polydipsia, polyphagia and polyuria. Genitourinary:  Negative for difficulty urinating and dysuria. Musculoskeletal:  Negative for arthralgias and back pain. Skin:  Negative for color change and rash. Neurological:  Positive for headaches. Negative for dizziness and light-headedness. Psychiatric/Behavioral:  Negative for dysphoric mood. The patient is not nervous/anxious.       Objective  Vitals:    12/13/22 0955   BP: 116/74   Site: Left Upper Arm   Position: Sitting   Cuff Size: Medium Adult   Pulse: 66   SpO2: 99%   Weight: 155 lb (70.3 kg)   Height: 4' 9\" (1.448 m)     Physical Exam  Constitutional:       General: She is not in acute distress. Appearance: Normal appearance. She is obese. She is not ill-appearing, toxic-appearing or diaphoretic. HENT:      Head: Normocephalic and atraumatic. Right Ear: External ear normal.      Left Ear: External ear normal.      Nose: Nose normal. No congestion or rhinorrhea. Eyes:      Extraocular Movements: Extraocular movements intact. Conjunctiva/sclera: Conjunctivae normal.      Pupils: Pupils are equal, round, and reactive to light. Cardiovascular:      Rate and Rhythm: Normal rate and regular rhythm. Pulses: Normal pulses. Heart sounds: Normal heart sounds. No murmur heard. Pulmonary:      Effort: Pulmonary effort is normal. No respiratory distress. Breath sounds: Normal breath sounds. No stridor. No wheezing, rhonchi or rales. Chest:      Chest wall: No tenderness. Musculoskeletal:         General: Normal range of motion. Cervical back: Normal range of motion and neck supple. No tenderness. Right lower leg: No edema. Left lower leg: No edema. Lymphadenopathy:      Cervical: No cervical adenopathy. Skin:     General: Skin is warm. Capillary Refill: Capillary refill takes less than 2 seconds. Coloration: Skin is not jaundiced. Findings: No erythema or lesion. Neurological:      General: No focal deficit present. Mental Status: She is alert and oriented to person, place, and time. Mental status is at baseline. Cranial Nerves: No cranial nerve deficit. Coordination: Coordination normal.      Gait: Gait normal.   Psychiatric:         Mood and Affect: Mood normal.         Behavior: Behavior normal.         Thought Content: Thought content normal.         Judgment: Judgment normal.       Assessment& Plan     Diagnosis Orders   1.  Mild intermittent asthma with acute exacerbation  methylPREDNISolone (MEDROL DOSEPACK) 4 MG tablet      2. Nocturnal headaches          Medrol dosepack as ordered for asthma exacerbation. F/u in 3 weeks for recheck. Suspect nocturnal headaches r/t asthma exacerbation at night. Side effects, adverse effects of the medication prescribed today, as well as treatment plan/ rationale and result expectations have been discussed with the patient who expresses understanding and desires to proceed. Close follow up to evaluate treatment results and for coordination of care. I have reviewed the patient's medical history in detail and updated the computerized patient record. As always, patient is advised that if symptoms worsen in any way they will proceed to the nearest emergency room. No orders of the defined types were placed in this encounter. Orders Placed This Encounter   Medications    methylPREDNISolone (MEDROL DOSEPACK) 4 MG tablet     Sig: Take by mouth. Dispense:  21 tablet     Refill:  0       Medications Discontinued During This Encounter   Medication Reason    nystatin-triamcinolone (MYCOLOG) 885084-3.1 UNIT/GM-% ointment Therapy completed       Return in about 3 weeks (around 1/3/2023) for asthma, headaches.     Conner Javier, RITA - CNP

## 2023-03-02 ENCOUNTER — OFFICE VISIT (OUTPATIENT)
Dept: OBGYN CLINIC | Age: 33
End: 2023-03-02
Payer: COMMERCIAL

## 2023-03-02 VITALS
DIASTOLIC BLOOD PRESSURE: 64 MMHG | SYSTOLIC BLOOD PRESSURE: 108 MMHG | BODY MASS INDEX: 33.54 KG/M2 | WEIGHT: 159.8 LBS | HEIGHT: 58 IN

## 2023-03-02 DIAGNOSIS — Z11.3 SCREEN FOR STD (SEXUALLY TRANSMITTED DISEASE): ICD-10-CM

## 2023-03-02 DIAGNOSIS — Z01.419 WELL WOMAN EXAM WITH ROUTINE GYNECOLOGICAL EXAM: Primary | ICD-10-CM

## 2023-03-02 PROCEDURE — 99395 PREV VISIT EST AGE 18-39: CPT | Performed by: OBSTETRICS & GYNECOLOGY

## 2023-03-02 PROCEDURE — G8484 FLU IMMUNIZE NO ADMIN: HCPCS | Performed by: OBSTETRICS & GYNECOLOGY

## 2023-03-02 RX ORDER — ASCORBIC ACID 500 MG
500 TABLET ORAL 2 TIMES DAILY
COMMUNITY

## 2023-03-02 ASSESSMENT — ENCOUNTER SYMPTOMS
DIARRHEA: 0
VOMITING: 0
ABDOMINAL DISTENTION: 0
CONSTIPATION: 0
ABDOMINAL PAIN: 0
ANAL BLEEDING: 0
ALLERGIC/IMMUNOLOGIC NEGATIVE: 1
EYES NEGATIVE: 1
NAUSEA: 0
RESPIRATORY NEGATIVE: 1
BLOOD IN STOOL: 0
RECTAL PAIN: 0

## 2023-03-02 ASSESSMENT — VISUAL ACUITY: OU: 1

## 2023-03-02 NOTE — PROGRESS NOTES
Subjective:      Patient ID: Kirby Feldman is a 34 y.o. female    Annual exam. Reviewed medical, surgical, social and family history. Also reviewed current medications and allergies. No GYN complaints. Normal cycles. Pap deferred ( negative co-testing ; next pap  ). STD screening offered. Monthly SBE encouraged. F/U annual or prn.       Vitals:  /64   Ht 4' 10\" (1.473 m)   Wt 159 lb 12.8 oz (72.5 kg)   LMP 2023 (Approximate)   BMI 33.40 kg/m²   Past Medical History:   Diagnosis Date    Abnormal Pap smear of cervix     Abnormal ultrasound of breast 2016    Acute bronchitis     Allergic rhinitis     Allergic rhinitis     Amenorrhea     Anemia     Arthralgia     Asthma     Bunionette     CTS (carpal tunnel syndrome) 2014    Cystic acne 2014    Fatigue     Fibromyalgia     Dr. Carola Espana    High risk medication use     Inflammatory polyarthropathy ([address]     Dr. Carola Espana, on plaquenil    Left breast lump     Low libido     Neck pain     Ovarian cyst 2016    right 3.5 cm    Paresthesia of both hands     Prediabetes 2018    Sciatica      Past Surgical History:   Procedure Laterality Date    ABDOMINOPLASTY  2014    BREAST ENHANCEMENT SURGERY  2014     SECTION  2006     SECTION  2004    LIPOSUCTION  2014    NOSE SURGERY      TUBAL LIGATION      WRIST SURGERY Right ,     right carpel tunnel release x , Dr. Juliet Ramos     Allergies:  Pcn [penicillins] and Ultram [place] hcl]  Current Outpatient Medications   Medication Sig Dispense Refill    vitamin C (ASCORBIC ACID) 500 MG tablet Take 500 mg by mouth 2 times daily      vitamin B-12 (CYANOCOBALAMIN) 1000 MCG tablet       naproxen (NAPROSYN) 500 MG tablet take 1 tablet by mouth every morning with food and take 1 tablet by mouth every evening with food 60 tablet 0    Cholecalciferol (VITAMIN D3) 125 MCG (5000 UT) TABS Take by mouth daily      fluticasone (FLOVENT HFA) 44 MCG/ACT inhaler Inhale 2 puffs into the lungs in the morning and 2 puffs before bedtime. 1 each 3    fluticasone (FLONASE) 50 MCG/ACT nasal spray 1 spray by Each Nostril route daily 1 each 0    tiZANidine (ZANAFLEX) 2 MG tablet Take 1 tablet by mouth nightly as needed (tension headache) (Patient not taking: Reported on 3/2/2023) 30 tablet 0     No current facility-administered medications for this visit. 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   Tobacco Use    Smoking status: Never    Smokeless tobacco: Never   Vaping Use    Vaping Use: Never used   Substance and Sexual Activity    Alcohol use:  Yes     Alcohol/week: 4.2 standard drinks     Types: 5 Standard drinks or equivalent per week     Comment: social    Drug use: No    Sexual activity: Yes     Partners: Male   Other Topics Concern    Not on file   Social History Narrative    Not on file     Social Determinants of Health     Financial Resource Strain: Low Risk     Difficulty of Paying Living Expenses: Not hard at all   Food Insecurity: No Food Insecurity    Worried About Running Out of Food in the Last Year: Never true    Ran Out of Food in the Last Year: Never true   Transportation Needs: Not on file   Physical Activity: Not on file   Stress: Not on file   Social Connections: Not on file   Intimate Partner Violence: Not on file   Housing Stability: Not on file     Family History   Problem Relation Age of Onset    Arthritis Mother         Rheumatoid Arthritis and fibromyalgia    Arthritis Father     High Blood Pressure Maternal Grandfather     Diabetes Maternal Grandfather     Heart Disease Maternal Grandfather     No Known Problems Paternal Grandfather     No Known Problems Paternal Grandmother     No Known Problems Maternal Grandmother     No Known Problems Brother     No Known Problems Sister     No Known Problems Other     Breast Cancer Neg Hx     Cancer Neg Hx     Colon Cancer Neg Hx     Eclampsia Neg Hx     Hypertension Neg Hx     Ovarian Cancer Neg Hx      Labor Neg Hx     Spont Abortions Neg Hx     Stroke Neg Hx        Review of Systems   Constitutional: Negative. Negative for activity change, appetite change, chills, diaphoresis, fatigue, fever and unexpected weight change. HENT: Negative. Eyes: Negative. Respiratory: Negative. Cardiovascular: Negative. Gastrointestinal:  Negative for abdominal distention, abdominal pain, anal bleeding, blood in stool, constipation, diarrhea, nausea, rectal pain and vomiting. Endocrine: Negative. Genitourinary:  Negative for decreased urine volume, difficulty urinating, dyspareunia, dysuria, enuresis, flank pain, frequency, genital sores, hematuria, menstrual problem, pelvic pain, urgency, vaginal bleeding, vaginal discharge and vaginal pain. Musculoskeletal: Negative. Skin: Negative. Allergic/Immunologic: Negative. Neurological: Negative. Hematological: Negative. Psychiatric/Behavioral: Negative. Objective:     Physical Exam  Constitutional:       Appearance: She is well-developed. HENT:      Head: Normocephalic. Eyes:      General: Lids are normal. Vision grossly intact. Neck:      Thyroid: No thyromegaly. Cardiovascular:      Rate and Rhythm: Normal rate and regular rhythm. Heart sounds: Normal heart sounds. Pulmonary:      Effort: Pulmonary effort is normal. No respiratory distress. Breath sounds: Normal breath sounds. No wheezing or rales. Chest:      Chest wall: No tenderness. Breasts:     Right: Normal. No swelling, bleeding, inverted nipple, mass, nipple discharge, skin change or tenderness. Left: Normal. No swelling, bleeding, inverted nipple, mass, nipple discharge, skin change or tenderness.    Abdominal:      General: There is no distension. Palpations: Abdomen is soft. There is no mass. Tenderness: There is no abdominal tenderness. There is no guarding or rebound. Hernia: No hernia is present. There is no hernia in the left inguinal area or right inguinal area. Genitourinary:     General: Normal vulva. Pubic Area: No rash. Labia:         Right: No rash, tenderness, lesion or injury. Left: No rash, tenderness, lesion or injury. Urethra: No prolapse, urethral swelling or urethral lesion. Vagina: Normal. No signs of injury and foreign body. No vaginal discharge, erythema, tenderness or bleeding. Cervix: No cervical motion tenderness, discharge or friability. Uterus: Not deviated, not enlarged, not fixed and not tender. Adnexa:         Right: No mass, tenderness or fullness. Left: No mass, tenderness or fullness. Rectum: Normal.   Musculoskeletal:         General: No tenderness. Normal range of motion. Cervical back: Normal range of motion and neck supple. Lymphadenopathy:      Cervical: No cervical adenopathy. Upper Body:      Right upper body: No supraclavicular or axillary adenopathy. Left upper body: No supraclavicular or axillary adenopathy. Lower Body: No right inguinal adenopathy. No left inguinal adenopathy. Skin:     General: Skin is warm and dry. Coloration: Skin is not pale. Findings: No erythema or rash. Neurological:      Mental Status: She is alert and oriented to person, place, and time. Psychiatric:         Behavior: Behavior normal.         Thought Content: Thought content normal.         Judgment: Judgment normal.       Assessment:       Diagnosis Orders   1. Well woman exam with routine gynecological exam        2.  Screen for STD (sexually transmitted disease)  Wet Prep, Genital    C.trachomatis N.gonorrhoeae DNA           Plan:      Medications placedthis encounter:  No orders of the defined types were placed in this encounter. Orders placedthis encounter:  Orders Placed This Encounter   Procedures    Wet Prep, Genital     Standing Status:   Future     Standing Expiration Date:   3/2/2024    C.trachomatis N.gonorrhoeae DNA     Standing Status:   Future     Standing Expiration Date:   3/2/2024           Follow up:  Return in about 1 year (around 3/2/2024) for Annual.  Repeat Annual GYN exam every 1 year. Cervical Cytology exam starts age 24. If Negative Cytology;  follow-up screening per current guidelines. Mammograms yearly starting at age 36. Calcium and Vitamin D dosing reviewed ( age appropriate ). Colonoscopy and bone density screening discussed ( age appropriate ). Birth control and STD prevention discussed ( age appropriate ). Gardisil counseling completed for all patients ( age appropriate ). Routine health maintenance ( per PCP and guidelines ).

## 2023-03-02 NOTE — PROGRESS NOTES
Chaperone for Intimate Exam  Chaperone was offered and accepted as part of the rooming process.   Chaperone: Ambrocio Emery MA

## 2023-03-03 LAB
CLUE CELLS: ABNORMAL
TRICHOMONAS PREP: ABNORMAL
TRICHOMONAS VAGINALIS SCREEN: NEGATIVE
YEAST WET PREP: ABNORMAL

## 2023-03-07 ENCOUNTER — PATIENT MESSAGE (OUTPATIENT)
Dept: OBGYN CLINIC | Age: 33
End: 2023-03-07

## 2023-03-07 RX ORDER — METRONIDAZOLE 500 MG/1
500 TABLET ORAL 2 TIMES DAILY
Qty: 14 TABLET | Refills: 0 | Status: SHIPPED | OUTPATIENT
Start: 2023-03-07 | End: 2023-03-14

## 2023-03-07 NOTE — TELEPHONE ENCOUNTER
From: Jim Macdonald  To: Dr. Garcia Fear: 3/7/2023 8:38 AM EST  Subject: Question regarding WET PREP    Good morning Claudia Slade,    Can you explain to me my results. Thank you.

## 2023-03-08 LAB
C TRACH DNA GENITAL QL NAA+PROBE: NEGATIVE
N. GONORRHOEAE DNA: NEGATIVE

## 2023-03-10 ENCOUNTER — TELEPHONE (OUTPATIENT)
Dept: OBGYN CLINIC | Age: 33
End: 2023-03-10

## 2023-03-10 NOTE — TELEPHONE ENCOUNTER
----- Message from Charm Goldmann, MD sent at 3/3/2023 12:05 PM EST -----  + BV. Flagyl 500 mg po bid x 7 days.

## 2023-03-10 NOTE — TELEPHONE ENCOUNTER
Bedside and Verbal shift change report given to ZAHRAA Ridley (oncoming nurse) by EMILIE Ramírez RN (offgoing nurse). Report included the following information SBAR, Kardex, Intake/Output, MAR and Recent Results. Pt aware and rx sent on 3/7/23

## 2023-05-23 ENCOUNTER — OFFICE VISIT (OUTPATIENT)
Dept: FAMILY MEDICINE CLINIC | Age: 33
End: 2023-05-23
Payer: COMMERCIAL

## 2023-05-23 VITALS
SYSTOLIC BLOOD PRESSURE: 100 MMHG | HEART RATE: 64 BPM | HEIGHT: 58 IN | OXYGEN SATURATION: 100 % | BODY MASS INDEX: 35.48 KG/M2 | WEIGHT: 169 LBS | DIASTOLIC BLOOD PRESSURE: 65 MMHG

## 2023-05-23 DIAGNOSIS — J45.20 MILD INTERMITTENT ASTHMA WITHOUT COMPLICATION: ICD-10-CM

## 2023-05-23 DIAGNOSIS — M79.7 FIBROMYALGIA: ICD-10-CM

## 2023-05-23 DIAGNOSIS — R51.9 NOCTURNAL HEADACHES: ICD-10-CM

## 2023-05-23 DIAGNOSIS — M19.90 INFLAMMATORY ARTHRITIS: ICD-10-CM

## 2023-05-23 DIAGNOSIS — R73.03 PREDIABETES: Primary | ICD-10-CM

## 2023-05-23 LAB — HBA1C MFR BLD: 5.8 %

## 2023-05-23 PROCEDURE — G8417 CALC BMI ABV UP PARAM F/U: HCPCS | Performed by: NURSE PRACTITIONER

## 2023-05-23 PROCEDURE — 83036 HEMOGLOBIN GLYCOSYLATED A1C: CPT | Performed by: NURSE PRACTITIONER

## 2023-05-23 PROCEDURE — G8427 DOCREV CUR MEDS BY ELIG CLIN: HCPCS | Performed by: NURSE PRACTITIONER

## 2023-05-23 PROCEDURE — 1036F TOBACCO NON-USER: CPT | Performed by: NURSE PRACTITIONER

## 2023-05-23 PROCEDURE — 99214 OFFICE O/P EST MOD 30 MIN: CPT | Performed by: NURSE PRACTITIONER

## 2023-05-23 RX ORDER — CARBOXYMETHYLCELLULOSE SODIUM 10 MG/ML
GEL OPHTHALMIC
COMMUNITY
Start: 2023-03-17

## 2023-05-23 SDOH — ECONOMIC STABILITY: INCOME INSECURITY: HOW HARD IS IT FOR YOU TO PAY FOR THE VERY BASICS LIKE FOOD, HOUSING, MEDICAL CARE, AND HEATING?: NOT HARD AT ALL

## 2023-05-23 SDOH — ECONOMIC STABILITY: FOOD INSECURITY: WITHIN THE PAST 12 MONTHS, YOU WORRIED THAT YOUR FOOD WOULD RUN OUT BEFORE YOU GOT MONEY TO BUY MORE.: NEVER TRUE

## 2023-05-23 SDOH — ECONOMIC STABILITY: FOOD INSECURITY: WITHIN THE PAST 12 MONTHS, THE FOOD YOU BOUGHT JUST DIDN'T LAST AND YOU DIDN'T HAVE MONEY TO GET MORE.: NEVER TRUE

## 2023-05-23 SDOH — ECONOMIC STABILITY: HOUSING INSECURITY
IN THE LAST 12 MONTHS, WAS THERE A TIME WHEN YOU DID NOT HAVE A STEADY PLACE TO SLEEP OR SLEPT IN A SHELTER (INCLUDING NOW)?: NO

## 2023-05-23 ASSESSMENT — ENCOUNTER SYMPTOMS
TROUBLE SWALLOWING: 0
BACK PAIN: 0
CONSTIPATION: 0
CHEST TIGHTNESS: 0
COLOR CHANGE: 0
SHORTNESS OF BREATH: 0
DIARRHEA: 0
EYE PAIN: 0
COUGH: 0
ABDOMINAL PAIN: 0

## 2023-05-23 ASSESSMENT — PATIENT HEALTH QUESTIONNAIRE - PHQ9
SUM OF ALL RESPONSES TO PHQ QUESTIONS 1-9: 0
SUM OF ALL RESPONSES TO PHQ QUESTIONS 1-9: 0
SUM OF ALL RESPONSES TO PHQ9 QUESTIONS 1 & 2: 0
SUM OF ALL RESPONSES TO PHQ QUESTIONS 1-9: 0
2. FEELING DOWN, DEPRESSED OR HOPELESS: 0
1. LITTLE INTEREST OR PLEASURE IN DOING THINGS: 0
SUM OF ALL RESPONSES TO PHQ QUESTIONS 1-9: 0

## 2023-05-23 NOTE — PROGRESS NOTES
Subjective  Chief Complaint   Patient presents with    Diabetes     Check up       HPI    Check up. Doing well overall. No pain currently, feeling good. Scheduled to see neurology on  through [facility] for fibromyalgia and HA. Eating healthy, exercising 5 days per week. Ashtma-well controlled using albuterol once weekly at the most, sometimes not at all. She is continuing flovent twice daily as prescribed.     Past Medical History:   Diagnosis Date    Abnormal Pap smear of cervix     Abnormal ultrasound of breast 2016    Acute bronchitis     Allergic rhinitis     Allergic rhinitis     Amenorrhea     Anemia     Arthralgia     Asthma     Bunionette     CTS (carpal tunnel syndrome) 2014    Cystic acne 2014    Fatigue     Fibromyalgia     Dr. Ketty Bolaños    High risk medication use     Inflammatory polyarthropathy ([address]     Dr. Ketty Bolaños, on plaquenil    Left breast lump     Low libido     Neck pain     Ovarian cyst 2016    right 3.5 cm    Paresthesia of both hands     Prediabetes 2018    Sciatica      Patient Active Problem List    Diagnosis Date Noted    Prediabetes 2018    Amenorrhea     Low libido     High risk medication use     Abnormal ultrasound of breast 2016    Left breast lump     Fibromyalgia     Inflammatory polyarthropathy (HCC)     Ovarian cyst 2016    Paresthesia of both hands     Allergic rhinitis     Asthma     Cystic acne 2014     Past Surgical History:   Procedure Laterality Date    ABDOMINOPLASTY  2014    BREAST ENHANCEMENT SURGERY  2014     SECTION  2006     SECTION  2004    LIPOSUCTION  2014    NOSE SURGERY      TUBAL LIGATION      WRIST SURGERY Right ,     right carpel tunnel release x , Dr. Pantera Mcgrath     Family History   Problem Relation Age of Onset    Arthritis Mother         Rheumatoid Arthritis and fibromyalgia    Arthritis Father     High Blood Pressure Maternal Grandfather     Diabetes Maternal Grandfather

## 2023-09-07 ENCOUNTER — OFFICE VISIT (OUTPATIENT)
Dept: FAMILY MEDICINE CLINIC | Age: 33
End: 2023-09-07
Payer: COMMERCIAL

## 2023-09-07 VITALS
HEART RATE: 54 BPM | BODY MASS INDEX: 35.68 KG/M2 | OXYGEN SATURATION: 97 % | HEIGHT: 58 IN | DIASTOLIC BLOOD PRESSURE: 84 MMHG | WEIGHT: 170 LBS | SYSTOLIC BLOOD PRESSURE: 130 MMHG

## 2023-09-07 DIAGNOSIS — M06.4 INFLAMMATORY POLYARTHROPATHY (HCC): ICD-10-CM

## 2023-09-07 DIAGNOSIS — R63.5 WEIGHT GAIN: ICD-10-CM

## 2023-09-07 DIAGNOSIS — R73.03 PREDIABETES: Primary | ICD-10-CM

## 2023-09-07 DIAGNOSIS — R68.82 DECREASED LIBIDO: ICD-10-CM

## 2023-09-07 DIAGNOSIS — Z00.00 ENCOUNTER FOR PREVENTIVE HEALTH EXAMINATION: ICD-10-CM

## 2023-09-07 DIAGNOSIS — R73.03 PREDIABETES: ICD-10-CM

## 2023-09-07 DIAGNOSIS — R53.83 FATIGUE, UNSPECIFIED TYPE: ICD-10-CM

## 2023-09-07 LAB
ALBUMIN SERPL-MCNC: 4.9 G/DL (ref 3.5–4.6)
ALP SERPL-CCNC: 46 U/L (ref 40–130)
ALT SERPL-CCNC: 15 U/L (ref 0–33)
ANION GAP SERPL CALCULATED.3IONS-SCNC: 13 MEQ/L (ref 9–15)
AST SERPL-CCNC: 23 U/L (ref 0–35)
BILIRUB SERPL-MCNC: 0.4 MG/DL (ref 0.2–0.7)
BUN SERPL-MCNC: 12 MG/DL (ref 6–20)
CALCIUM SERPL-MCNC: 9.6 MG/DL (ref 8.5–9.9)
CHLORIDE SERPL-SCNC: 103 MEQ/L (ref 95–107)
CO2 SERPL-SCNC: 23 MEQ/L (ref 20–31)
CREAT SERPL-MCNC: 0.7 MG/DL (ref 0.5–0.9)
ERYTHROCYTE [DISTWIDTH] IN BLOOD BY AUTOMATED COUNT: 15.7 % (ref 11.5–14.5)
GLOBULIN SER CALC-MCNC: 2.9 G/DL (ref 2.3–3.5)
GLUCOSE SERPL-MCNC: 94 MG/DL (ref 70–99)
HBA1C MFR BLD: 6.1 % (ref 4.8–5.9)
HCT VFR BLD AUTO: 39.6 % (ref 37–47)
HGB BLD-MCNC: 13 G/DL (ref 12–16)
MCH RBC QN AUTO: 26.3 PG (ref 27–31.3)
MCHC RBC AUTO-ENTMCNC: 32.8 % (ref 33–37)
MCV RBC AUTO: 80.3 FL (ref 79.4–94.8)
PLATELET # BLD AUTO: 296 K/UL (ref 130–400)
POTASSIUM SERPL-SCNC: 5 MEQ/L (ref 3.4–4.9)
PROT SERPL-MCNC: 7.8 G/DL (ref 6.3–8)
RBC # BLD AUTO: 4.93 M/UL (ref 4.2–5.4)
SODIUM SERPL-SCNC: 139 MEQ/L (ref 135–144)
TSH REFLEX: 0.48 UIU/ML (ref 0.44–3.86)
WBC # BLD AUTO: 5.8 K/UL (ref 4.8–10.8)

## 2023-09-07 PROCEDURE — 1036F TOBACCO NON-USER: CPT | Performed by: NURSE PRACTITIONER

## 2023-09-07 PROCEDURE — G8417 CALC BMI ABV UP PARAM F/U: HCPCS | Performed by: NURSE PRACTITIONER

## 2023-09-07 PROCEDURE — G8427 DOCREV CUR MEDS BY ELIG CLIN: HCPCS | Performed by: NURSE PRACTITIONER

## 2023-09-07 PROCEDURE — 99214 OFFICE O/P EST MOD 30 MIN: CPT | Performed by: NURSE PRACTITIONER

## 2023-09-07 ASSESSMENT — ENCOUNTER SYMPTOMS
TROUBLE SWALLOWING: 0
EYE PAIN: 0
CONSTIPATION: 0
DIARRHEA: 0
ABDOMINAL PAIN: 0
SHORTNESS OF BREATH: 0
COUGH: 0
BACK PAIN: 0
COLOR CHANGE: 0
CHEST TIGHTNESS: 0

## 2023-09-07 NOTE — PROGRESS NOTES
Check labs as ordered. Suspect these sx could be residual from effexor. F/u after lab work. Side effects, adverse effects of the medication prescribed today, as well as treatment plan/ rationale and result expectations have been discussed with the patient who expresses understanding and desires to proceed. Close follow up to evaluate treatment results and for coordination of care. I have reviewed the patient's medical history in detail and updated the computerized patient record. As always, patient is advised that if symptoms worsen in any way they will proceed to the nearest emergency room. Orders Placed This Encounter   Procedures    CBC     Standing Status:   Future     Number of Occurrences:   1     Standing Expiration Date:   9/7/2024    Comprehensive Metabolic Panel     Standing Status:   Future     Number of Occurrences:   1     Standing Expiration Date:   9/7/2024    TSH with Reflex     Standing Status:   Future     Number of Occurrences:   1     Standing Expiration Date:   9/7/2024    Hemoglobin A1C     Standing Status:   Future     Number of Occurrences:   1     Standing Expiration Date:   9/7/2024    Vitamin D 25 Hydroxy     Standing Status:   Future     Number of Occurrences:   1     Standing Expiration Date:   9/7/2024    Vitamin B12 & Folate     Standing Status:   Future     Number of Occurrences:   1     Standing Expiration Date:   9/7/2024       No orders of the defined types were placed in this encounter. There are no discontinued medications. Return if symptoms worsen or fail to improve.     Ashok Martines APRN - CNP

## 2023-09-08 LAB
FOLATE: 19 NG/ML
VITAMIN B-12: 917 PG/ML (ref 232–1245)
VITAMIN D 25-HYDROXY: 46.8 NG/ML

## 2023-11-24 ENCOUNTER — OFFICE VISIT (OUTPATIENT)
Dept: FAMILY MEDICINE CLINIC | Age: 33
End: 2023-11-24

## 2023-11-24 VITALS
HEIGHT: 58 IN | HEART RATE: 59 BPM | DIASTOLIC BLOOD PRESSURE: 70 MMHG | SYSTOLIC BLOOD PRESSURE: 118 MMHG | WEIGHT: 165 LBS | BODY MASS INDEX: 34.63 KG/M2 | OXYGEN SATURATION: 99 %

## 2023-11-24 DIAGNOSIS — R73.03 PREDIABETES: Primary | ICD-10-CM

## 2023-11-24 DIAGNOSIS — J45.20 MILD INTERMITTENT ASTHMA WITHOUT COMPLICATION: ICD-10-CM

## 2023-11-24 PROCEDURE — 99213 OFFICE O/P EST LOW 20 MIN: CPT | Performed by: NURSE PRACTITIONER

## 2023-11-24 ASSESSMENT — ENCOUNTER SYMPTOMS
CHEST TIGHTNESS: 0
ABDOMINAL PAIN: 0
SHORTNESS OF BREATH: 0
DIARRHEA: 0
BACK PAIN: 0
TROUBLE SWALLOWING: 0
CONSTIPATION: 0
EYE PAIN: 0
COLOR CHANGE: 0
COUGH: 0

## 2023-11-24 NOTE — PROGRESS NOTES
Subjective  Chief Complaint   Patient presents with    Follow-up     6 mos f/u diabetes. No question or concern. HPI    Check up. Doing well overall. No pain currently, feeling good. No longer seeing neurology, no longer having any pain or headaches. Eating healthy, exercising 5 days per week. Ashtma-well controlled using albuterol once weekly at the most, sometimes not at all. She is continuing flovent twice daily as prescribed. Currently has a cold, but otherwise breathing has been well controlled.     Past Medical History:   Diagnosis Date    Abnormal Pap smear of cervix     Abnormal ultrasound of breast 2016    Acute bronchitis     Allergic rhinitis     Allergic rhinitis     Amenorrhea     Anemia     Arthralgia     Asthma     Bunionette     CTS (carpal tunnel syndrome) 2014    Cystic acne 2014    Fatigue     Fibromyalgia     Dr. Jae Waterman    High risk medication use     Inflammatory polyarthropathy ([address])     Dr. Jae Waterman, on plaquenil    Left breast lump     Low libido     Neck pain     Ovarian cyst 2016    right 3.5 cm    Paresthesia of both hands     Prediabetes 2018    Sciatica      Patient Active Problem List    Diagnosis Date Noted    Prediabetes 2018    Amenorrhea     Low libido     High risk medication use     Abnormal ultrasound of breast 2016    Left breast lump     Fibromyalgia     Inflammatory polyarthropathy (HCC)     Ovarian cyst 2016    Paresthesia of both hands     Allergic rhinitis     Asthma     Cystic acne 2014     Past Surgical History:   Procedure Laterality Date    ABDOMINOPLASTY  2014    BREAST ENHANCEMENT SURGERY  2014     SECTION  2006     SECTION  2004    LIPOSUCTION  2014    NOSE SURGERY      TUBAL LIGATION      WRIST SURGERY Right ,     right carpel tunnel release x , Dr. Bourne Daughters     Family History   Problem Relation Age of Onset    Arthritis Mother         Rheumatoid Arthritis and

## 2024-02-28 ENCOUNTER — OFFICE VISIT (OUTPATIENT)
Dept: FAMILY MEDICINE CLINIC | Age: 34
End: 2024-02-28

## 2024-02-28 VITALS
DIASTOLIC BLOOD PRESSURE: 66 MMHG | HEART RATE: 70 BPM | WEIGHT: 173 LBS | OXYGEN SATURATION: 97 % | HEIGHT: 58 IN | SYSTOLIC BLOOD PRESSURE: 118 MMHG | BODY MASS INDEX: 36.31 KG/M2

## 2024-02-28 DIAGNOSIS — E66.01 CLASS 2 SEVERE OBESITY WITH SERIOUS COMORBIDITY AND BODY MASS INDEX (BMI) OF 36.0 TO 36.9 IN ADULT, UNSPECIFIED OBESITY TYPE (HCC): ICD-10-CM

## 2024-02-28 DIAGNOSIS — M06.4 INFLAMMATORY POLYARTHROPATHY (HCC): ICD-10-CM

## 2024-02-28 DIAGNOSIS — R73.03 PREDIABETES: Primary | ICD-10-CM

## 2024-02-28 PROCEDURE — 99214 OFFICE O/P EST MOD 30 MIN: CPT | Performed by: NURSE PRACTITIONER

## 2024-02-28 ASSESSMENT — PATIENT HEALTH QUESTIONNAIRE - PHQ9
1. LITTLE INTEREST OR PLEASURE IN DOING THINGS: 0
SUM OF ALL RESPONSES TO PHQ QUESTIONS 1-9: 0
SUM OF ALL RESPONSES TO PHQ QUESTIONS 1-9: 0
SUM OF ALL RESPONSES TO PHQ9 QUESTIONS 1 & 2: 0
2. FEELING DOWN, DEPRESSED OR HOPELESS: 0
SUM OF ALL RESPONSES TO PHQ QUESTIONS 1-9: 0
SUM OF ALL RESPONSES TO PHQ QUESTIONS 1-9: 0

## 2024-02-28 NOTE — PROGRESS NOTES
Subjective  Chief Complaint   Patient presents with    Weight Management     Would like to discuss weight loss medications, states that she has gained 10 lbs in a few months.       HPI    Pt here to discuss weight.    Has gained 10 lbs in the last month. Feeling very frustrated. Not exercising currently, took a break d/t having some fatigue in her body. Eating very healthy, no sugar/candy, only drinks water. Pt with prediabetes. Has tried adipex in the past with minimal weight loss.    Past Medical History:   Diagnosis Date    Abnormal Pap smear of cervix     Abnormal ultrasound of breast 2016    Acute bronchitis     Allergic rhinitis     Allergic rhinitis     Amenorrhea     Anemia     Arthralgia     Asthma     Bunionette     CTS (carpal tunnel syndrome) 2014    Cystic acne 2014    Fatigue     Fibromyalgia     Dr. Cintron    High risk medication use     Inflammatory polyarthropathy (HCC)     Dr. Cintron, on plaquenil    Left breast lump     Low libido     Neck pain     Ovarian cyst 2016    right 3.5 cm    Paresthesia of both hands     Prediabetes 2018    Sciatica      Patient Active Problem List    Diagnosis Date Noted    Prediabetes 2018    Amenorrhea     Low libido     High risk medication use     Abnormal ultrasound of breast 2016    Left breast lump     Fibromyalgia     Inflammatory polyarthropathy (HCC)     Ovarian cyst 2016    Paresthesia of both hands     Allergic rhinitis     Asthma     Cystic acne 2014     Past Surgical History:   Procedure Laterality Date    ABDOMINOPLASTY  2014    BREAST ENHANCEMENT SURGERY  2014     SECTION  2006     SECTION  2004    LIPOSUCTION      NOSE SURGERY      TUBAL LIGATION      WRIST SURGERY Right ,     right carpel tunnel release x , Dr. Khan     Family History   Problem Relation Age of Onset    Arthritis Mother         Rheumatoid Arthritis and fibromyalgia    Arthritis Father     High Blood

## 2024-03-01 ASSESSMENT — ENCOUNTER SYMPTOMS
BACK PAIN: 0
TROUBLE SWALLOWING: 0
ABDOMINAL PAIN: 0
COUGH: 0
CONSTIPATION: 0
DIARRHEA: 0
EYE PAIN: 0
SHORTNESS OF BREATH: 0
COLOR CHANGE: 0
CHEST TIGHTNESS: 0

## 2024-03-05 ENCOUNTER — OFFICE VISIT (OUTPATIENT)
Dept: OBGYN CLINIC | Age: 34
End: 2024-03-05

## 2024-03-05 VITALS
DIASTOLIC BLOOD PRESSURE: 72 MMHG | BODY MASS INDEX: 36.11 KG/M2 | HEIGHT: 58 IN | WEIGHT: 172 LBS | SYSTOLIC BLOOD PRESSURE: 114 MMHG

## 2024-03-05 DIAGNOSIS — Z72.51 UNPROTECTED SEX: ICD-10-CM

## 2024-03-05 DIAGNOSIS — N94.10 DYSPAREUNIA IN FEMALE: ICD-10-CM

## 2024-03-05 DIAGNOSIS — Z01.419 ENCOUNTER FOR WELL WOMAN EXAM WITH ROUTINE GYNECOLOGICAL EXAM: Primary | ICD-10-CM

## 2024-03-05 DIAGNOSIS — Z11.3 ROUTINE SCREENING FOR STI (SEXUALLY TRANSMITTED INFECTION): ICD-10-CM

## 2024-03-05 DIAGNOSIS — R68.82 DECREASED LIBIDO: ICD-10-CM

## 2024-03-05 DIAGNOSIS — N76.0 OTHER VAGINITIS: ICD-10-CM

## 2024-03-05 PROCEDURE — 99395 PREV VISIT EST AGE 18-39: CPT | Performed by: OBSTETRICS & GYNECOLOGY

## 2024-03-05 PROCEDURE — 99213 OFFICE O/P EST LOW 20 MIN: CPT | Performed by: OBSTETRICS & GYNECOLOGY

## 2024-03-05 ASSESSMENT — ENCOUNTER SYMPTOMS
ABDOMINAL PAIN: 0
RESPIRATORY NEGATIVE: 1
ANAL BLEEDING: 0
VOMITING: 0
NAUSEA: 0
DIARRHEA: 0
ABDOMINAL DISTENTION: 0
BLOOD IN STOOL: 0
EYES NEGATIVE: 1
ALLERGIC/IMMUNOLOGIC NEGATIVE: 1
RECTAL PAIN: 0
CONSTIPATION: 0

## 2024-03-05 ASSESSMENT — VISUAL ACUITY: OU: 1

## 2024-03-05 NOTE — PROGRESS NOTES
Subjective:      Patient ID: Michelle Morales is a 33 y.o. female    Annual exam.  Reviewed medical, surgical, social and family history.  Also reviewed current medications and allergies.   Normal cycles.  Pap deferred ( negative co-testing ; next pap  ).  STD screening offered.  Monthly SBE encouraged.  F/U annual or prn.      Pt also wished to discuss vaginal dyspareunia and decreased libido.  States she does not necessarily feel dry, just some vaginal discomfort with certain positions.  Recommended water based lubricant with SA ( Astroglide liquid ).   Discussed libido primarily psychosocially driven and often not a hormonal issue.  Patient has normal cycles; no contraception.  No meds that decrease libido.  Discussed healthy diet with proteins and complex carbs ( in moderation ).  Discussed exercise and activity.  Also discussed certain foods ( figs, avocados, bananas, and chocolate ) as well as supplements like saw palmetto, ginseng and pricila root. Also discussed Ristela from [employer] for libido.  All questions answered.          Vitals:  LMP 2024 (Exact Date)   Past Medical History:   Diagnosis Date    Abnormal Pap smear of cervix     Abnormal ultrasound of breast 2016    Acute bronchitis     Allergic rhinitis     Allergic rhinitis     Amenorrhea     Anemia     Arthralgia     Asthma     Bunionette     CTS (carpal tunnel syndrome) 2014    Cystic acne 2014    Fatigue     Fibromyalgia     Dr. Cintron    High risk medication use     Inflammatory polyarthropathy (HCC)     Dr. Cintron, on plaquenil    Left breast lump     Low libido     Neck pain     Ovarian cyst 2016    right 3.5 cm    Paresthesia of both hands     Prediabetes 2018    Sciatica      Past Surgical History:   Procedure Laterality Date    ABDOMINOPLASTY  2014    BREAST ENHANCEMENT SURGERY  2014     SECTION  2006     SECTION  2004    LIPOSUCTION  2014    NOSE SURGERY      TUBAL

## 2024-03-06 LAB
CLUE CELLS VAG QL WET PREP: NORMAL
T VAGINALIS VAG QL WET PREP: NORMAL
TRICHOMONAS VAGINALIS SCREEN: NEGATIVE
YEAST VAG QL WET PREP: NORMAL

## 2024-03-08 LAB
C TRACH DNA CVX QL NAA+PROBE: NEGATIVE
N GONORRHOEA DNA CERV MUCUS QL NAA+PROBE: NEGATIVE

## 2025-06-26 ENCOUNTER — OFFICE VISIT (OUTPATIENT)
Dept: FAMILY MEDICINE CLINIC | Age: 35
End: 2025-06-26
Payer: COMMERCIAL

## 2025-06-26 VITALS
WEIGHT: 137.4 LBS | OXYGEN SATURATION: 99 % | HEIGHT: 58 IN | TEMPERATURE: 98 F | HEART RATE: 54 BPM | DIASTOLIC BLOOD PRESSURE: 60 MMHG | SYSTOLIC BLOOD PRESSURE: 122 MMHG | BODY MASS INDEX: 28.84 KG/M2

## 2025-06-26 DIAGNOSIS — Z90.3 H/O GASTRIC SLEEVE: ICD-10-CM

## 2025-06-26 DIAGNOSIS — B35.3 TINEA PEDIS OF RIGHT FOOT: ICD-10-CM

## 2025-06-26 DIAGNOSIS — R20.0 NUMBNESS AND TINGLING OF BOTH UPPER EXTREMITIES: ICD-10-CM

## 2025-06-26 DIAGNOSIS — R20.2 NUMBNESS AND TINGLING OF BOTH UPPER EXTREMITIES: ICD-10-CM

## 2025-06-26 DIAGNOSIS — Z00.00 ENCOUNTER FOR PREVENTIVE HEALTH EXAMINATION: ICD-10-CM

## 2025-06-26 DIAGNOSIS — J30.9 ALLERGIC RHINITIS, UNSPECIFIED SEASONALITY, UNSPECIFIED TRIGGER: ICD-10-CM

## 2025-06-26 DIAGNOSIS — Z13.220 LIPID SCREENING: ICD-10-CM

## 2025-06-26 DIAGNOSIS — M06.4 INFLAMMATORY POLYARTHROPATHY (HCC): ICD-10-CM

## 2025-06-26 DIAGNOSIS — J45.20 MILD INTERMITTENT ASTHMA WITHOUT COMPLICATION: ICD-10-CM

## 2025-06-26 DIAGNOSIS — R73.03 PREDIABETES: Primary | ICD-10-CM

## 2025-06-26 LAB
ALBUMIN SERPL-MCNC: 4.5 G/DL (ref 3.5–4.6)
ALP SERPL-CCNC: 48 U/L (ref 40–130)
ALT SERPL-CCNC: 16 U/L (ref 0–33)
ANION GAP SERPL CALCULATED.3IONS-SCNC: 10 MEQ/L (ref 9–15)
AST SERPL-CCNC: 26 U/L (ref 0–35)
BILIRUB SERPL-MCNC: 0.4 MG/DL (ref 0.2–0.7)
BUN SERPL-MCNC: 13 MG/DL (ref 6–20)
CALCIUM SERPL-MCNC: 9.1 MG/DL (ref 8.5–9.9)
CHLORIDE SERPL-SCNC: 100 MEQ/L (ref 95–107)
CHOLEST SERPL-MCNC: 196 MG/DL (ref 0–199)
CO2 SERPL-SCNC: 26 MEQ/L (ref 20–31)
CREAT SERPL-MCNC: 0.66 MG/DL (ref 0.5–0.9)
ERYTHROCYTE [DISTWIDTH] IN BLOOD BY AUTOMATED COUNT: 14.6 % (ref 11.5–14.5)
FERRITIN: 3 NG/ML (ref 15–150)
FOLATE: 13.8 NG/ML (ref 4.8–24.2)
GLOBULIN SER CALC-MCNC: 3.3 G/DL (ref 2.3–3.5)
GLUCOSE SERPL-MCNC: 89 MG/DL (ref 70–99)
HCT VFR BLD AUTO: 33.9 % (ref 37–47)
HDLC SERPL-MCNC: 72 MG/DL (ref 40–59)
HGB BLD-MCNC: 10.6 G/DL (ref 12–16)
IRON % SATURATION: 6 % (ref 20–55)
IRON: 24 UG/DL (ref 37–145)
LDLC SERPL CALC-MCNC: 107 MG/DL (ref 0–129)
MCH RBC QN AUTO: 23.7 PG (ref 27–31.3)
MCHC RBC AUTO-ENTMCNC: 31.3 % (ref 33–37)
MCV RBC AUTO: 75.8 FL (ref 79.4–94.8)
PLATELET # BLD AUTO: 364 K/UL (ref 130–400)
POTASSIUM SERPL-SCNC: 4.2 MEQ/L (ref 3.4–4.9)
PROT SERPL-MCNC: 7.8 G/DL (ref 6.3–8)
RBC # BLD AUTO: 4.47 M/UL (ref 4.2–5.4)
SODIUM SERPL-SCNC: 136 MEQ/L (ref 135–144)
TOTAL IRON BINDING CAPACITY: 434 UG/DL (ref 250–450)
TRIGL SERPL-MCNC: 84 MG/DL (ref 0–150)
UNSATURATED IRON BINDING CAPACITY: 410 UG/DL (ref 112–347)
VITAMIN B-12: 979 PG/ML (ref 232–1245)
VITAMIN D 25-HYDROXY: 40.8 NG/ML (ref 30–100)
WBC # BLD AUTO: 6.8 K/UL (ref 4.8–10.8)

## 2025-06-26 PROCEDURE — 99214 OFFICE O/P EST MOD 30 MIN: CPT | Performed by: NURSE PRACTITIONER

## 2025-06-26 PROCEDURE — 83036 HEMOGLOBIN GLYCOSYLATED A1C: CPT | Performed by: NURSE PRACTITIONER

## 2025-06-26 RX ORDER — KETOCONAZOLE 20 MG/G
CREAM TOPICAL
Qty: 60 G | Refills: 0 | Status: SHIPPED | OUTPATIENT
Start: 2025-06-26

## 2025-06-26 RX ORDER — FLUTICASONE PROPIONATE 50 MCG
1 SPRAY, SUSPENSION (ML) NASAL DAILY
Qty: 1 EACH | Refills: 0 | Status: SHIPPED | OUTPATIENT
Start: 2025-06-26

## 2025-06-26 SDOH — ECONOMIC STABILITY: FOOD INSECURITY: WITHIN THE PAST 12 MONTHS, THE FOOD YOU BOUGHT JUST DIDN'T LAST AND YOU DIDN'T HAVE MONEY TO GET MORE.: NEVER TRUE

## 2025-06-26 SDOH — ECONOMIC STABILITY: FOOD INSECURITY: WITHIN THE PAST 12 MONTHS, YOU WORRIED THAT YOUR FOOD WOULD RUN OUT BEFORE YOU GOT MONEY TO BUY MORE.: NEVER TRUE

## 2025-06-26 ASSESSMENT — PATIENT HEALTH QUESTIONNAIRE - PHQ9
SUM OF ALL RESPONSES TO PHQ QUESTIONS 1-9: 0
SUM OF ALL RESPONSES TO PHQ QUESTIONS 1-9: 0
1. LITTLE INTEREST OR PLEASURE IN DOING THINGS: NOT AT ALL
SUM OF ALL RESPONSES TO PHQ QUESTIONS 1-9: 0
2. FEELING DOWN, DEPRESSED OR HOPELESS: NOT AT ALL
SUM OF ALL RESPONSES TO PHQ QUESTIONS 1-9: 0

## 2025-06-26 ASSESSMENT — ENCOUNTER SYMPTOMS
DIARRHEA: 0
CONSTIPATION: 0
BACK PAIN: 0
EYE PAIN: 0
COUGH: 0
SHORTNESS OF BREATH: 0
ABDOMINAL PAIN: 0
CHEST TIGHTNESS: 0
COLOR CHANGE: 0
TROUBLE SWALLOWING: 0

## 2025-06-26 NOTE — PROGRESS NOTES
Expiration Date:   2025     Which body part?:   Right Upper Extremity     Which body part?:   Left Upper Extremity     Reason for exam:   bilateral upper ext numbness and tingling       Orders Placed This Encounter   Medications    fluticasone (FLONASE) 50 MCG/ACT nasal spray     Si spray by Each Nostril route daily     Dispense:  1 each     Refill:  0    ketoconazole (NIZORAL) 2 % cream     Sig: Apply topically twice daily for at least 4 weeks (or for 1 week after lesions have healed).     Dispense:  60 g     Refill:  0       Medications Discontinued During This Encounter   Medication Reason    fluticasone (FLONASE) 50 MCG/ACT nasal spray REORDER       No follow-ups on file.    Daiana Maldonado, APRN - CNP

## 2025-06-29 LAB
TRANSFERRIN SERPL-MCNC: 360 MG/DL (ref 200–360)
ZINC SERPL-MCNC: 78.6 UG/DL (ref 60–120)

## 2025-06-30 LAB
ANNOTATION COMMENT IMP: NORMAL
RETINYL PALMITATE SERPL-MCNC: 0.03 MG/L (ref 0–0.1)
VIT A SERPL-MCNC: 0.4 MG/L (ref 0.3–1.2)

## 2025-07-02 ENCOUNTER — RESULTS FOLLOW-UP (OUTPATIENT)
Dept: FAMILY MEDICINE CLINIC | Age: 35
End: 2025-07-02

## 2025-07-02 ENCOUNTER — HOSPITAL ENCOUNTER (OUTPATIENT)
Dept: NEUROLOGY | Age: 35
Discharge: HOME | End: 2025-07-02
Payer: COMMERCIAL

## 2025-07-02 DIAGNOSIS — R20.0 NUMBNESS AND TINGLING OF BOTH UPPER EXTREMITIES: ICD-10-CM

## 2025-07-02 DIAGNOSIS — R20.2 NUMBNESS AND TINGLING OF BOTH UPPER EXTREMITIES: ICD-10-CM

## 2025-07-02 LAB — VIT B6 SERPL-MCNC: 47.7 NMOL/L (ref 20–125)

## 2025-07-02 PROCEDURE — 95910 NRV CNDJ TEST 7-8 STUDIES: CPT

## 2025-07-02 PROCEDURE — 95886 MUSC TEST DONE W/N TEST COMP: CPT

## 2025-07-02 NOTE — PROCEDURES
[facility]                   [address] [place] [address]                             ELECTROMYOGRAM      PATIENT NAME: JOSEFA BENITEZ    : 1990  MED REC NO: [id number]                        ROOM:   ACCOUNT NO: [id number]                       ADMIT DATE: 2025  PROVIDER: Fady Reynoso MD      REFERRING PHYSICIAN:  Daiana Maldonado NP    REASON FOR STUDY:  The patient was having numbness and tingling in the hands, being somewhat worse on the left side.    She had decompression of the right median nerve in .    Motor nerve conduction velocities and F-wave latencies are normal in all the nerves tested.    Distal motor latencies are normal in the ulnar nerves, but relatively delayed in the median nerves.     Distal sensory latencies are normal in the ulnar nerves and relatively delayed in the median nerves.    On the concentric needle electrode examination, denervation changes are apparent in the abductor pollicis brevis muscles bilaterally.    As compared to previous study, there is a mild worsening in the left median nerve.    CLINICAL INTERPRETATION:  There are residual changes of right carpal tunnel syndrome.    The patient is developing early changes of left carpal tunnel syndrome also.  Patient being tried on conservative management.  Wrist splints, Occupational Therapy, anti-inflammatory analgesics may help the patient      If clinically indicated, we could repeat the study in 6 months.      Thank you, Ms. Maldonado, for allowing me to see this patient.  Please feel free to call me if I can be of any further assistance regarding this patient's evaluation.          FADY REYNOSO MD    D:  2025 14:10:37     T:  2025 14:57:58     DM/VERAS  Job #:  [id number]     Doc#:  [id number]

## 2025-08-04 ENCOUNTER — OFFICE VISIT (OUTPATIENT)
Dept: OBGYN CLINIC | Age: 35
End: 2025-08-04
Payer: COMMERCIAL

## 2025-08-04 VITALS
SYSTOLIC BLOOD PRESSURE: 110 MMHG | WEIGHT: 137 LBS | HEART RATE: 70 BPM | DIASTOLIC BLOOD PRESSURE: 70 MMHG | BODY MASS INDEX: 28.63 KG/M2

## 2025-08-04 DIAGNOSIS — Z11.51 SPECIAL SCREENING EXAMINATION FOR HUMAN PAPILLOMAVIRUS (HPV): ICD-10-CM

## 2025-08-04 DIAGNOSIS — Z01.419 WOMEN'S ANNUAL ROUTINE GYNECOLOGICAL EXAMINATION: Primary | ICD-10-CM

## 2025-08-04 DIAGNOSIS — R68.82 DECREASED LIBIDO: ICD-10-CM

## 2025-08-04 DIAGNOSIS — N95.1 MENOPAUSAL SYMPTOMS: ICD-10-CM

## 2025-08-04 DIAGNOSIS — R10.2 PELVIC PAIN: ICD-10-CM

## 2025-08-04 DIAGNOSIS — Z01.419 WOMEN'S ANNUAL ROUTINE GYNECOLOGICAL EXAMINATION: ICD-10-CM

## 2025-08-04 DIAGNOSIS — N89.8 VAGINAL DRYNESS: ICD-10-CM

## 2025-08-04 PROCEDURE — 99395 PREV VISIT EST AGE 18-39: CPT | Performed by: STUDENT IN AN ORGANIZED HEALTH CARE EDUCATION/TRAINING PROGRAM

## 2025-08-04 PROCEDURE — 99214 OFFICE O/P EST MOD 30 MIN: CPT | Performed by: STUDENT IN AN ORGANIZED HEALTH CARE EDUCATION/TRAINING PROGRAM

## 2025-08-04 RX ORDER — NORETHINDRONE ACETATE AND ETHINYL ESTRADIOL 1MG-20(21)
1 KIT ORAL DAILY
Qty: 1 PACKET | Refills: 3 | Status: SHIPPED | OUTPATIENT
Start: 2025-08-04

## 2025-08-04 RX ORDER — ESTRADIOL 0.1 MG/G
CREAM VAGINAL
Qty: 42.5 G | Refills: 3 | Status: SHIPPED | OUTPATIENT
Start: 2025-08-04

## 2025-08-07 LAB
HPV HR 12 DNA SPEC QL NAA+PROBE: NOT DETECTED
HPV16 DNA SPEC QL NAA+PROBE: NOT DETECTED
HPV16+18+H RISK 12 DNA SPEC-IMP: NORMAL
HPV18 DNA SPEC QL NAA+PROBE: NOT DETECTED

## 2025-08-13 ENCOUNTER — HOSPITAL ENCOUNTER (OUTPATIENT)
Dept: ULTRASOUND IMAGING | Age: 35
Discharge: HOME | End: 2025-08-15
Attending: STUDENT IN AN ORGANIZED HEALTH CARE EDUCATION/TRAINING PROGRAM
Payer: COMMERCIAL

## 2025-08-13 DIAGNOSIS — R10.2 PELVIC PAIN: ICD-10-CM

## 2025-08-13 PROCEDURE — 76830 TRANSVAGINAL US NON-OB: CPT

## 2025-08-13 PROCEDURE — 93975 VASCULAR STUDY: CPT

## 2025-08-13 PROCEDURE — 76856 US EXAM PELVIC COMPLETE: CPT

## 2025-08-14 DIAGNOSIS — N95.1 MENOPAUSAL SYMPTOMS: ICD-10-CM

## 2025-08-15 LAB
ESTRADIOL LEVEL: 107 PG/ML
FOLLICLE STIMULATING HORMONE: 3.8 MIU/ML
LH: 5.2 MIU/ML (ref 1.7–8.6)

## 2025-08-21 ENCOUNTER — TELEMEDICINE (OUTPATIENT)
Dept: FAMILY MEDICINE CLINIC | Age: 35
End: 2025-08-21
Payer: COMMERCIAL

## 2025-08-21 DIAGNOSIS — L30.9 DERMATITIS: ICD-10-CM

## 2025-08-21 DIAGNOSIS — M54.6 CHRONIC MIDLINE THORACIC BACK PAIN: Primary | ICD-10-CM

## 2025-08-21 DIAGNOSIS — G89.29 CHRONIC MIDLINE THORACIC BACK PAIN: Primary | ICD-10-CM

## 2025-08-21 DIAGNOSIS — M54.2 CERVICALGIA: ICD-10-CM

## 2025-08-21 LAB — PROGEST SERPL-MCNC: <0.1 NG/ML

## 2025-08-21 PROCEDURE — 99214 OFFICE O/P EST MOD 30 MIN: CPT | Performed by: NURSE PRACTITIONER

## 2025-08-21 RX ORDER — CLOTRIMAZOLE AND BETAMETHASONE DIPROPIONATE 10; .64 MG/G; MG/G
CREAM TOPICAL
Qty: 45 G | Refills: 0 | Status: SHIPPED | OUTPATIENT
Start: 2025-08-21

## 2025-08-21 ASSESSMENT — ENCOUNTER SYMPTOMS
DIARRHEA: 0
EYE PAIN: 0
CHEST TIGHTNESS: 0
ABDOMINAL PAIN: 0
BACK PAIN: 1
COLOR CHANGE: 0
SHORTNESS OF BREATH: 0
TROUBLE SWALLOWING: 0
COUGH: 0
CONSTIPATION: 0